# Patient Record
Sex: FEMALE | Race: BLACK OR AFRICAN AMERICAN | NOT HISPANIC OR LATINO | Employment: STUDENT | URBAN - METROPOLITAN AREA
[De-identification: names, ages, dates, MRNs, and addresses within clinical notes are randomized per-mention and may not be internally consistent; named-entity substitution may affect disease eponyms.]

---
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
## 2022-01-04 ENCOUNTER — LAB VISIT (OUTPATIENT)
Dept: PRIMARY CARE CLINIC | Facility: OTHER | Age: 22
End: 2022-01-04
Payer: MEDICAID

## 2022-01-04 DIAGNOSIS — Z20.822 ENCOUNTER FOR LABORATORY TESTING FOR COVID-19 VIRUS: ICD-10-CM

## 2022-01-04 PROCEDURE — U0003 INFECTIOUS AGENT DETECTION BY NUCLEIC ACID (DNA OR RNA); SEVERE ACUTE RESPIRATORY SYNDROME CORONAVIRUS 2 (SARS-COV-2) (CORONAVIRUS DISEASE [COVID-19]), AMPLIFIED PROBE TECHNIQUE, MAKING USE OF HIGH THROUGHPUT TECHNOLOGIES AS DESCRIBED BY CMS-2020-01-R: HCPCS | Performed by: INTERNAL MEDICINE

## 2022-01-08 LAB
SARS-COV-2 RNA RESP QL NAA+PROBE: DETECTED
SARS-COV-2- CYCLE NUMBER: 25

## 2022-08-10 ENCOUNTER — PATIENT OUTREACH (OUTPATIENT)
Dept: EMERGENCY MEDICINE | Facility: HOSPITAL | Age: 22
End: 2022-08-10
Payer: MEDICAID

## 2022-10-18 ENCOUNTER — OFFICE VISIT (OUTPATIENT)
Dept: URGENT CARE | Facility: CLINIC | Age: 22
End: 2022-10-18
Payer: MEDICAID

## 2022-10-18 VITALS
HEIGHT: 63 IN | WEIGHT: 160 LBS | RESPIRATION RATE: 18 BRPM | SYSTOLIC BLOOD PRESSURE: 123 MMHG | OXYGEN SATURATION: 98 % | DIASTOLIC BLOOD PRESSURE: 79 MMHG | TEMPERATURE: 98 F | BODY MASS INDEX: 28.35 KG/M2 | HEART RATE: 74 BPM

## 2022-10-18 DIAGNOSIS — N76.0 ACUTE VAGINITIS: Primary | ICD-10-CM

## 2022-10-18 DIAGNOSIS — N89.8 VAGINAL ITCHING: ICD-10-CM

## 2022-10-18 LAB
BILIRUB UR QL STRIP: NEGATIVE
GLUCOSE UR QL STRIP: NEGATIVE
KETONES UR QL STRIP: NEGATIVE
LEUKOCYTE ESTERASE UR QL STRIP: NEGATIVE
PH, POC UA: 6 (ref 5–8)
POC BLOOD, URINE: NEGATIVE
POC NITRATES, URINE: NEGATIVE
PROT UR QL STRIP: NEGATIVE
SP GR UR STRIP: 1.02 (ref 1–1.03)
UROBILINOGEN UR STRIP-ACNC: NORMAL (ref 0.1–1.1)

## 2022-10-18 PROCEDURE — 3078F PR MOST RECENT DIASTOLIC BLOOD PRESSURE < 80 MM HG: ICD-10-PCS | Mod: CPTII,S$GLB,, | Performed by: PHYSICIAN ASSISTANT

## 2022-10-18 PROCEDURE — 99214 OFFICE O/P EST MOD 30 MIN: CPT | Mod: S$GLB,,, | Performed by: PHYSICIAN ASSISTANT

## 2022-10-18 PROCEDURE — 1160F RVW MEDS BY RX/DR IN RCRD: CPT | Mod: CPTII,S$GLB,, | Performed by: PHYSICIAN ASSISTANT

## 2022-10-18 PROCEDURE — 3078F DIAST BP <80 MM HG: CPT | Mod: CPTII,S$GLB,, | Performed by: PHYSICIAN ASSISTANT

## 2022-10-18 PROCEDURE — 1159F MED LIST DOCD IN RCRD: CPT | Mod: CPTII,S$GLB,, | Performed by: PHYSICIAN ASSISTANT

## 2022-10-18 PROCEDURE — 1160F PR REVIEW ALL MEDS BY PRESCRIBER/CLIN PHARMACIST DOCUMENTED: ICD-10-PCS | Mod: CPTII,S$GLB,, | Performed by: PHYSICIAN ASSISTANT

## 2022-10-18 PROCEDURE — 3074F SYST BP LT 130 MM HG: CPT | Mod: CPTII,S$GLB,, | Performed by: PHYSICIAN ASSISTANT

## 2022-10-18 PROCEDURE — 99214 PR OFFICE/OUTPT VISIT, EST, LEVL IV, 30-39 MIN: ICD-10-PCS | Mod: S$GLB,,, | Performed by: PHYSICIAN ASSISTANT

## 2022-10-18 PROCEDURE — 87591 N.GONORRHOEAE DNA AMP PROB: CPT | Performed by: PHYSICIAN ASSISTANT

## 2022-10-18 PROCEDURE — 1159F PR MEDICATION LIST DOCUMENTED IN MEDICAL RECORD: ICD-10-PCS | Mod: CPTII,S$GLB,, | Performed by: PHYSICIAN ASSISTANT

## 2022-10-18 PROCEDURE — 81003 URINALYSIS AUTO W/O SCOPE: CPT | Mod: QW,S$GLB,, | Performed by: PHYSICIAN ASSISTANT

## 2022-10-18 PROCEDURE — 3074F PR MOST RECENT SYSTOLIC BLOOD PRESSURE < 130 MM HG: ICD-10-PCS | Mod: CPTII,S$GLB,, | Performed by: PHYSICIAN ASSISTANT

## 2022-10-18 PROCEDURE — 81514 NFCT DS BV&VAGINITIS DNA ALG: CPT | Performed by: PHYSICIAN ASSISTANT

## 2022-10-18 PROCEDURE — 81003 POCT URINALYSIS, DIPSTICK, AUTOMATED, W/O SCOPE: ICD-10-PCS | Mod: QW,S$GLB,, | Performed by: PHYSICIAN ASSISTANT

## 2022-10-18 PROCEDURE — 87491 CHLMYD TRACH DNA AMP PROBE: CPT | Performed by: PHYSICIAN ASSISTANT

## 2022-10-18 RX ORDER — METRONIDAZOLE 500 MG/1
500 TABLET ORAL EVERY 12 HOURS
Qty: 14 TABLET | Refills: 0 | Status: SHIPPED | OUTPATIENT
Start: 2022-10-18 | End: 2022-10-25

## 2022-10-18 NOTE — PROGRESS NOTES
"Subjective:       Patient ID: Kevyn Arnold is a 21 y.o. female.    Vitals:  height is 5' 3" (1.6 m) and weight is 72.6 kg (160 lb). Her tympanic temperature is 98.2 °F (36.8 °C). Her blood pressure is 123/79 and her pulse is 74. Her respiration is 18 and oxygen saturation is 98%.     Chief Complaint: Vaginal Itching    Patient provider note starts here:  Patient presents with complaints of vaginal irritation and discharge which has been present for the past week. Reports that she had her menstrual cycle twice this past month and it recently stopped about a week ago when these symptoms started. She feels that it may be related to wearing pads. Denies ulcerations to the genital region. Reports some dysuria when the urine hits the genital skin. History of PID in the past.    Vaginal Itching  The patient's primary symptoms include genital itching, a genital rash and vaginal discharge. The patient's pertinent negatives include no genital odor, pelvic pain or vaginal bleeding. Primary symptoms comment: genital irriation. The current episode started in the past 7 days (7 days). The problem occurs constantly. The problem has been gradually worsening. The problem affects both sides. She is not pregnant. Associated symptoms include dysuria (Skin irritation) and rash. Pertinent negatives include no abdominal pain, diarrhea, discolored urine, fever, flank pain, frequency, headaches, nausea, painful intercourse or vomiting. The vaginal discharge was milky, yellow and white. There has been no bleeding. She has not been passing clots. She has not been passing tissue. The symptoms are aggravated by urinating. Treatments tried: Aquifer, powder and diaper rash creme, The treatment provided no relief. She is sexually active. No, her partner does not have an STD. She uses oral contraceptives for contraception. Her menstrual history has been irregular. There is no history of miscarriage, ovarian cysts, an STD or vaginosis. "     Constitution: Negative for fever.   Neck: Negative for neck pain.   Cardiovascular:  Negative for chest pain, palpitations and sob on exertion.   Respiratory:  Negative for chest tightness and wheezing.    Gastrointestinal:  Negative for abdominal pain, nausea, vomiting and diarrhea.   Genitourinary:  Positive for dysuria (Skin irritation), irregular menstruation and vaginal discharge. Negative for frequency, flank pain, ovarian cysts, genital sore and pelvic pain.   Skin:  Positive for rash. Negative for color change and wound.   Neurological:  Negative for headaches, numbness and tingling.     Objective:      Physical Exam   Constitutional: She is oriented to person, place, and time. She appears well-developed.   HENT:   Head: Normocephalic and atraumatic.   Ears:   Right Ear: External ear normal.   Left Ear: External ear normal.   Nose: Nose normal. No nasal deformity. No epistaxis.   Mouth/Throat: Oropharynx is clear and moist and mucous membranes are normal.   Eyes: Lids are normal.   Neck: Trachea normal and phonation normal. Neck supple.   Cardiovascular: Normal pulses.   Pulmonary/Chest: Effort normal.   Abdominal: Normal appearance. There is no left CVA tenderness and no right CVA tenderness.   Genitourinary:    Vaginal discharge present.           Comments: Chaperone present. External exam was performed. There is a thin white discharge noted to the external genitalia coming from the vaginal opening. There is no visible rash or ulcerations noted.        Neurological: She is alert and oriented to person, place, and time.   Skin: Skin is warm, dry and intact.   Psychiatric: Her speech is normal and behavior is normal.   Nursing note and vitals reviewed.      Assessment:       1. Acute vaginitis    2. Vaginal itching        Results for orders placed or performed in visit on 10/18/22   POCT Urinalysis, Dipstick, Automated, W/O Scope   Result Value Ref Range    POC Blood, Urine Negative Negative    POC  Bilirubin, Urine Negative Negative    POC Urobilinogen, Urine normal 0.1 - 1.1    POC Ketones, Urine Negative Negative    POC Protein, Urine Negative Negative    POC Nitrates, Urine Negative Negative    POC Glucose, Urine Negative Negative    pH, UA 6.0 5 - 8    POC Specific Gravity, Urine 1.020 1.003 - 1.029    POC Leukocytes, Urine Negative Negative       Plan:         Acute vaginitis  -     Ambulatory referral/consult to Gynecology    Vaginal itching  -     POCT Urinalysis, Dipstick, Automated, W/O Scope  -     VAGINOSIS SCREEN BY DNA PROBE  -     C. trachomatis/N. gonorrhoeae by AMP DNA [facility]; Urine  -     metroNIDAZOLE (FLAGYL) 500 MG tablet; Take 1 tablet (500 mg total) by mouth every 12 (twelve) hours. for 7 days  Dispense: 14 tablet; Refill: 0         Medical Decision Making:   History:   Old Medical Records: I decided to obtain old medical records.  Differential Diagnosis:   Differential Diagnosis includes, but is not limited to:  Vaginal infection such as yeast infection, vaginitis, topical irritant, bacterial vaginosis, STD such as gonorrhea, chlamydia, UTI, discomfort of pregnancy, ectopic pregnancy, ovarian cysts, ovarian torsion, constipation, colitis, diverticulitis      Clinical Tests:   Lab Tests: Ordered and Reviewed       <> Summary of Lab: UA normal    Urgent Care Management:  Patient presents with complaints of vaginal irritation and burning. On exam, she is afebrile and nontoxic appearing. There is a thin white discharge noted on external exam. Vaginosis swab and GC/chlamydia tests were sent and she has been prescribed Flagyl and encouraged to follow-up with GYN. ED precautions discussed, safe sex practices discussed and encouraged to refrain from intercourse until test results are available and partners are tested and treated if needed. She verbalized understanding and agreed with plan.        Patient Instructions   You should not have sex until you complete the medications that were  prescribed and/or he received all test results.  All partners will need to be tested and treated if you test positive for anything.  You should use condoms in the future to prevent sexually transmitted infections.    The proper amount of time needed to pass before getting proper test results after juan a sexually transmitted infection are as follows:  2 weeks: trichomonas, gonorrhea and chlamydia   1 week to 3 months: syphilis (RPR)  6 weeks to 3 months: HIV, Herpes, hepatitis C and B.     If you have negative tests please make sure to repeat testing in 3-6 months.      You must understand that you've received an Urgent Care treatment only and that you may be released before all your medical problems are known or treated. You, the patient, will arrange for follow up care as instructed.      Follow up with your PCP or specialty clinic as instructed in the next 2-3 days if not improved or as needed. You can call [phone] to schedule an appointment with appropriate provider.      If you condition worsens, we recommend that you receive another evaluation at the emergency room immediately or contact your primary medical clinic's after hours call service to discuss your concerns.      Please return here or go to the Emergency Department for any concerns or worsening condition.      If you were prescribed a narcotic or controlled substance, do not drive or operate heavy equipment or machinery while taking these medications.

## 2022-10-18 NOTE — PATIENT INSTRUCTIONS
You should not have sex until you complete the medications that were prescribed and/or he received all test results.  All partners will need to be tested and treated if you test positive for anything.  You should use condoms in the future to prevent sexually transmitted infections.    The proper amount of time needed to pass before getting proper test results after juan a sexually transmitted infection are as follows:  2 weeks: trichomonas, gonorrhea and chlamydia   1 week to 3 months: syphilis (RPR)  6 weeks to 3 months: HIV, Herpes, hepatitis C and B.     If you have negative tests please make sure to repeat testing in 3-6 months.      You must understand that you've received an Urgent Care treatment only and that you may be released before all your medical problems are known or treated. You, the patient, will arrange for follow up care as instructed.      Follow up with your PCP or specialty clinic as instructed in the next 2-3 days if not improved or as needed. You can call [phone] to schedule an appointment with appropriate provider.      If you condition worsens, we recommend that you receive another evaluation at the emergency room immediately or contact your primary medical clinic's after hours call service to discuss your concerns.      Please return here or go to the Emergency Department for any concerns or worsening condition.      If you were prescribed a narcotic or controlled substance, do not drive or operate heavy equipment or machinery while taking these medications.

## 2022-10-19 ENCOUNTER — TELEPHONE (OUTPATIENT)
Dept: URGENT CARE | Facility: CLINIC | Age: 22
End: 2022-10-19
Payer: MEDICAID

## 2022-10-19 DIAGNOSIS — B37.31 YEAST INFECTION OF THE VAGINA: Primary | ICD-10-CM

## 2022-10-19 LAB
BACTERIAL VAGINOSIS DNA: NEGATIVE
C TRACH DNA SPEC QL NAA+PROBE: NOT DETECTED
CANDIDA GLABRATA DNA: NEGATIVE
CANDIDA KRUSEI DNA: NEGATIVE
CANDIDA RRNA VAG QL PROBE: POSITIVE
N GONORRHOEA DNA SPEC QL NAA+PROBE: NOT DETECTED
T VAGINALIS RRNA GENITAL QL PROBE: NEGATIVE

## 2022-10-19 RX ORDER — FLUCONAZOLE 200 MG/1
200 TABLET ORAL DAILY
Qty: 2 TABLET | Refills: 0 | Status: SHIPPED | OUTPATIENT
Start: 2022-10-19 | End: 2022-10-21

## 2022-10-19 NOTE — TELEPHONE ENCOUNTER
CALL PATIENT TO DISCUSS SWAB RESULT WHICH IS POSITIVE FOR [facility] BUT SHE DOES NOT HAVE A PHONE SET UP.  I WILL SEND A PRESCRIPTION FOR DIFLUCAN TO HER PHARMACY.

## 2022-10-19 NOTE — TELEPHONE ENCOUNTER
A CALL PATIENT AND ATTEMPTED TO LEAVE A VOICEMAIL BUT IT STATES THAT THE PHONE IS NOT ACCEPTING CALLS.

## 2022-11-21 ENCOUNTER — TELEPHONE (OUTPATIENT)
Dept: OBSTETRICS AND GYNECOLOGY | Facility: CLINIC | Age: 22
End: 2022-11-21
Payer: MEDICAID

## 2022-11-21 ENCOUNTER — OFFICE VISIT (OUTPATIENT)
Dept: OBSTETRICS AND GYNECOLOGY | Facility: CLINIC | Age: 22
End: 2022-11-21
Payer: MEDICAID

## 2022-11-21 VITALS — WEIGHT: 166 LBS | DIASTOLIC BLOOD PRESSURE: 70 MMHG | BODY MASS INDEX: 29.41 KG/M2 | SYSTOLIC BLOOD PRESSURE: 107 MMHG

## 2022-11-21 DIAGNOSIS — Z00.00 ROUTINE CHECK-UP: ICD-10-CM

## 2022-11-21 DIAGNOSIS — R10.84 GENERALIZED ABDOMINAL PAIN: ICD-10-CM

## 2022-11-21 DIAGNOSIS — Z11.3 SCREENING EXAMINATION FOR SEXUALLY TRANSMITTED DISEASE: ICD-10-CM

## 2022-11-21 DIAGNOSIS — N76.0 ACUTE VAGINITIS: Primary | ICD-10-CM

## 2022-11-21 PROCEDURE — 1159F MED LIST DOCD IN RCRD: CPT | Mod: CPTII,,, | Performed by: NURSE PRACTITIONER

## 2022-11-21 PROCEDURE — 1160F RVW MEDS BY RX/DR IN RCRD: CPT | Mod: CPTII,,, | Performed by: NURSE PRACTITIONER

## 2022-11-21 PROCEDURE — 99203 PR OFFICE/OUTPT VISIT, NEW, LEVL III, 30-44 MIN: ICD-10-PCS | Mod: S$PBB,,, | Performed by: NURSE PRACTITIONER

## 2022-11-21 PROCEDURE — 87591 N.GONORRHOEAE DNA AMP PROB: CPT | Performed by: NURSE PRACTITIONER

## 2022-11-21 PROCEDURE — 1159F PR MEDICATION LIST DOCUMENTED IN MEDICAL RECORD: ICD-10-PCS | Mod: CPTII,,, | Performed by: NURSE PRACTITIONER

## 2022-11-21 PROCEDURE — 87491 CHLMYD TRACH DNA AMP PROBE: CPT | Performed by: NURSE PRACTITIONER

## 2022-11-21 PROCEDURE — 99999 PR PBB SHADOW E&M-EST. PATIENT-LVL III: CPT | Mod: PBBFAC,,, | Performed by: NURSE PRACTITIONER

## 2022-11-21 PROCEDURE — 3074F SYST BP LT 130 MM HG: CPT | Mod: CPTII,,, | Performed by: NURSE PRACTITIONER

## 2022-11-21 PROCEDURE — 1160F PR REVIEW ALL MEDS BY PRESCRIBER/CLIN PHARMACIST DOCUMENTED: ICD-10-PCS | Mod: CPTII,,, | Performed by: NURSE PRACTITIONER

## 2022-11-21 PROCEDURE — 81514 NFCT DS BV&VAGINITIS DNA ALG: CPT | Performed by: NURSE PRACTITIONER

## 2022-11-21 PROCEDURE — 3074F PR MOST RECENT SYSTOLIC BLOOD PRESSURE < 130 MM HG: ICD-10-PCS | Mod: CPTII,,, | Performed by: NURSE PRACTITIONER

## 2022-11-21 PROCEDURE — 99213 OFFICE O/P EST LOW 20 MIN: CPT | Mod: PBBFAC,PO | Performed by: NURSE PRACTITIONER

## 2022-11-21 PROCEDURE — 3078F PR MOST RECENT DIASTOLIC BLOOD PRESSURE < 80 MM HG: ICD-10-PCS | Mod: CPTII,,, | Performed by: NURSE PRACTITIONER

## 2022-11-21 PROCEDURE — 3008F BODY MASS INDEX DOCD: CPT | Mod: CPTII,,, | Performed by: NURSE PRACTITIONER

## 2022-11-21 PROCEDURE — 99203 OFFICE O/P NEW LOW 30 MIN: CPT | Mod: S$PBB,,, | Performed by: NURSE PRACTITIONER

## 2022-11-21 PROCEDURE — 3008F PR BODY MASS INDEX (BMI) DOCUMENTED: ICD-10-PCS | Mod: CPTII,,, | Performed by: NURSE PRACTITIONER

## 2022-11-21 PROCEDURE — 3078F DIAST BP <80 MM HG: CPT | Mod: CPTII,,, | Performed by: NURSE PRACTITIONER

## 2022-11-21 PROCEDURE — 99999 PR PBB SHADOW E&M-EST. PATIENT-LVL III: ICD-10-PCS | Mod: PBBFAC,,, | Performed by: NURSE PRACTITIONER

## 2022-11-21 NOTE — TELEPHONE ENCOUNTER
----- Message from Wendy Cohen sent at 11/21/2022  1:24 PM CST -----  Type:  Needs Medical Advice    Who Called: Pt  Would the patient rather a call back or a response via [facility]? call  Best Call Back Number: [phone]  Additional Information: pt is running 18 mins late went to wrong location please call pt

## 2022-11-21 NOTE — PROGRESS NOTES
"CC: Abdominal pain    Kevyn Arnold is a 22 y.o. female  Patient's last menstrual period was 10/20/2022 (approximate). presents with abdominal pain. Pain location and time varies, she states she sometimes has abdominal pain all over, she can go days with no pain. She also reports nausea and vomiting but has no known associated triggers. States she has  "alexa of" abnormal vaginal discharge. Denies vaginal itching, vaginal odor or dysuria. She uses nexplanon for contraception, placed , has irregular cycles.    Sexually active with male and female partners.     She states shes looking for a primary care provider      Past Surgical History:   Procedure Laterality Date     SECTION         OB History    Para Term  AB Living   1         2   SAB IAB Ectopic Multiple Live Births         1 2      # Outcome Date GA Lbr Nikhil/2nd Weight Sex Delivery Anes PTL Lv   1A       CS-Unspec   IZABELA   1B       CS-Unspec   IZABELA       Family History   Problem Relation Age of Onset    Diabetes Maternal Grandfather        Social History     Socioeconomic History    Marital status: Single   Tobacco Use    Smoking status: Never    Smokeless tobacco: Never   Substance and Sexual Activity    Alcohol use: Never    Drug use: Never    Sexual activity: Yes     Partners: Male     Birth control/protection: Implant       /70   Wt 75.3 kg (166 lb 0.1 oz)   LMP 10/20/2022 (Approximate)   BMI 29.41 kg/m²     ROS:  GENERAL: No fever, chills, fatigability or weight loss.  VULVAR: No pain, no lesions and no itching.  VAGINAL: No relaxation, no itching, no discharge, no abnormal bleeding and no lesions.  ABDOMEN: + abdominal pain. + nausea. + vomiting. No diarrhea. No constipation  BREAST: Denies pain. No lumps. No discharge.  URINARY: No incontinence, no nocturia, no frequency and no dysuria.  CARDIOVASCULAR: No chest pain. No shortness of breath. No leg cramps.  NEUROLOGICAL: No headaches. No vision " changes.      PHYSICAL EXAM:    APPEARANCE: Well nourished, well developed, in no acute distress.  AFFECT: Alert and oriented x 3  SKIN: Warm, dry, & intact. No acne or hirsutism.  NECK: Neck symmetric  NODES: No inguinal or femoral lymph node enlargement  CHEST:  Easy, even breaths.  ABDOMEN: Soft.  Nontender, nondistended.  PELVIC: Normal external genitalia without lesions.  Normal hair distribution.  Adequate perineal body, normal urethral meatus.  Vagina moist and well rugated without lesions or discharge.  Cervix pink, without lesions, discharge or tenderness.  No significant cystocele or rectocele. Bimanual exam shows uterus to be normal size, regular, mobile and nontender.  Adnexa without masses or tenderness.    Anus Perineum: No lesions, no relaxation, no external hemorrhoids.  EXTREMITIES: No edema.    ASSESSMENT AND PLAN  1. Acute vaginitis        2. Generalized abdominal pain  Vaginosis Screen by DNA Probe    C. trachomatis/N. gonorrhoeae by AMP DNA    US Pelvis Comp with Transvag NON-OB (xpd    US Abdomen Complete    POCT urine pregnancy      3. Screening examination for sexually transmitted disease  HIV 1/2 Ag/Ab (4th Gen)    RPR    Hepatitis Panel, Acute      4. Routine check-up  Ambulatory referral/consult to Internal Medicine        Discussed:  -Possible causes of abdominal pain including GYN vs GI causes, evaluation as above, no acute findings on todays exam  -Irregular cycles are a common side effect of nexplanon  -Recommend establishing care with a primary care provider to r/o other causes of abdominal pain and for routine health care maintenance.     Follow up: Pending lab results

## 2022-11-22 ENCOUNTER — TELEPHONE (OUTPATIENT)
Dept: URGENT CARE | Facility: CLINIC | Age: 22
End: 2022-11-22
Payer: MEDICAID

## 2022-11-22 LAB
C TRACH DNA SPEC QL NAA+PROBE: NOT DETECTED
N GONORRHOEA DNA SPEC QL NAA+PROBE: NOT DETECTED

## 2022-11-22 NOTE — TELEPHONE ENCOUNTER
Spoke with pt regarding lab results. Provider sent prescription to pharmacy. Pt instructed to call and schedule appoint for referral.

## 2022-11-23 ENCOUNTER — OFFICE VISIT (OUTPATIENT)
Dept: FAMILY MEDICINE | Facility: HOSPITAL | Age: 22
End: 2022-11-23
Payer: MEDICAID

## 2022-11-23 VITALS
SYSTOLIC BLOOD PRESSURE: 111 MMHG | WEIGHT: 162.69 LBS | HEIGHT: 63 IN | DIASTOLIC BLOOD PRESSURE: 62 MMHG | BODY MASS INDEX: 28.82 KG/M2 | HEART RATE: 82 BPM

## 2022-11-23 DIAGNOSIS — F41.1 GAD (GENERALIZED ANXIETY DISORDER): ICD-10-CM

## 2022-11-23 DIAGNOSIS — Z00.00 ROUTINE CHECK-UP: ICD-10-CM

## 2022-11-23 DIAGNOSIS — G43.109 MIGRAINE WITH AURA AND WITHOUT STATUS MIGRAINOSUS, NOT INTRACTABLE: Primary | ICD-10-CM

## 2022-11-23 DIAGNOSIS — R10.30 LOWER ABDOMINAL PAIN: ICD-10-CM

## 2022-11-23 DIAGNOSIS — N83.201 RIGHT OVARIAN CYST: Primary | ICD-10-CM

## 2022-11-23 DIAGNOSIS — B37.31 CANDIDA VAGINITIS: Primary | ICD-10-CM

## 2022-11-23 LAB
BACTERIAL VAGINOSIS DNA: NEGATIVE
CANDIDA GLABRATA DNA: NEGATIVE
CANDIDA KRUSEI DNA: NEGATIVE
CANDIDA RRNA VAG QL PROBE: POSITIVE
T VAGINALIS RRNA GENITAL QL PROBE: NEGATIVE

## 2022-11-23 PROCEDURE — 99214 OFFICE O/P EST MOD 30 MIN: CPT | Performed by: STUDENT IN AN ORGANIZED HEALTH CARE EDUCATION/TRAINING PROGRAM

## 2022-11-23 RX ORDER — ESCITALOPRAM OXALATE 10 MG/1
10 TABLET ORAL DAILY
Qty: 30 TABLET | Refills: 11 | Status: SHIPPED | OUTPATIENT
Start: 2022-11-23 | End: 2023-05-18 | Stop reason: SDUPTHER

## 2022-11-23 RX ORDER — PROPRANOLOL HYDROCHLORIDE 20 MG/1
10 TABLET ORAL 2 TIMES DAILY
Qty: 30 TABLET | Refills: 11 | Status: SHIPPED | OUTPATIENT
Start: 2022-11-23 | End: 2023-11-23

## 2022-11-23 RX ORDER — IBUPROFEN 800 MG/1
800 TABLET ORAL 3 TIMES DAILY PRN
Qty: 30 TABLET | Refills: 2 | Status: SHIPPED | OUTPATIENT
Start: 2022-11-23 | End: 2023-05-18 | Stop reason: SDUPTHER

## 2022-11-23 RX ORDER — SUMATRIPTAN 50 MG/1
50 TABLET, FILM COATED ORAL
Qty: 30 TABLET | Refills: 2 | Status: SHIPPED | OUTPATIENT
Start: 2022-11-23 | End: 2023-05-18

## 2022-11-23 RX ORDER — FLUCONAZOLE 150 MG/1
150 TABLET ORAL DAILY
Qty: 2 TABLET | Refills: 1 | Status: SHIPPED | OUTPATIENT
Start: 2022-11-23 | End: 2023-01-23

## 2022-11-23 NOTE — PROGRESS NOTES
History & Physical  [facility]      SUBJECTIVE:     History of Present Illness:  Patient is a 22 y.o. female presents to clinic to establish care.  Patient's chief complaints are intermittent abdominal pain, migraines, anxiety.    For patient's abdominal pain, patient states that she has history of ruptured ovarian cysts, states that her pain is similar to this however does not appear to correlate with regular cycles.  However patient has been on Nexplanon and has had irregular cycles.  Patient describes the pain as acute onset and rapid resolution.  Is moderately controlled on OTC pain medications.  Denies any bladder or bowel involvement, no difference with food.  Has been seen by OB Gyne for this issue and is scheduled for pelvic imaging with ultrasound.    For patient's migraine patient states that she had a history of migraines in her childhood however they subsided for several years.  They have since returned after her son turned to.  States she gets multiple migraines a week.  States that the pain is manageable if she takes a Tylenol PM within 30 minutes of headache onset however if she does not use migraines in persist up to several days.  States that her triggers seem to include loud noises, bright lights.  Also notices or prior to headaches with changes in visual stimuli.  Patient has never been on a controller medication nor a Triptan.      Patient also endorses anxiety and depression mostly related to home stress and .  Is interested in starting therapy as well as pharmacotherapy.        - Colonoscopy: N/A   (Grade A: adults 45-75; colonoscopy q10y, fecal immunochemical testing (FIT) q1y, FIT-fecal DNA testing (Cologuard) q3y)  - DM: Last A1c: DUE today  (Grade B: adults 40-70 with BMI ?25; if normal, repeat q3y)  - If Diabetic:   - Foot Exam: N/A    - Eye Exam: N/A  - MMG: N/A  (Grade B: q1-2y for women 40-75; >75 after discussion on harms and benefits with patient)  - PAP: COMPLETED  "-  pending  (Grade A: q3y with cervical cytology alone in women 21-29 years. For women 30-65 years, q3y with cervical cytology alone, q5y with high-risk HPV (hrHPV) testing alone, or q5y with hrHPV testing in combination with cytology)  - Statin: NO  (Grade B: adults 40-75 years with no history of CVD, ?1 CVD risk factors (dyslipidemia, DM, HTN, or smoking), and ASCVD ?10%)  Last lipid panel-  today  - Flu: N/A  (All patients ?6 months, qyear)  - Pneumococcal Vaccination: N/A  (All patients >18 w/ risk factors or all patients ?65: 1 dose PCV15 followed by PPSV23 or 1 dose PCV20)  - Shingles: N/A  (adults ?50 years)  - HCV: DUE - ORDERED  (Grade B: screen all patients age 18-79)  - HIV: DUE - ORDERED  (Grade A: ages 15-65 years; ?66 if high-risk)  Last STI testing-  candida, otherwise wnl  - DXA: N/A  (Grade B: women ?65 years or post-menopausal women with risk-factors)  - LDCT: N/A  (Grade B: q1yr for adults 50-80 years with 20 PY and currently smoke or have quit ?15 years)  - US abdomen: N/A   (Grade B: one-time screening for AAA in men 65-75 years who have ever smoked)  - Depression: N/A  (All adults)  - Fall risk: N/A  Get Up and Go Test (positive >30 seconds, or negative <20; get up, walk 10 feet, turn around and sit; adults ?65 years).       Review of patient's allergies indicates:  No Known Allergies    Past Medical History:   Diagnosis Date    Anemia      Past Surgical History:   Procedure Laterality Date     SECTION       Family History   Problem Relation Age of Onset    Diabetes Maternal Grandfather      Social History     Tobacco Use    Smoking status: Never    Smokeless tobacco: Never   Substance Use Topics    Alcohol use: Never    Drug use: Never        OBJECTIVE:     Vital Signs (Most Recent)  Vitals:    22 1316   BP: 111/62   Pulse: 82   Weight: 73.8 kg (162 lb 11.2 oz)   Height: 5' 3" (1.6 m)     Body mass index is 28.82 kg/m².     Review of Systems   Constitutional:  Negative for " chills, fever and malaise/fatigue.   HENT:  Negative for congestion, ear pain, hearing loss and sore throat.    Eyes:  Negative for blurred vision, pain and redness.   Respiratory:  Negative for cough and shortness of breath.    Cardiovascular:  Negative for chest pain, palpitations and leg swelling.   Gastrointestinal:  Positive for abdominal pain. Negative for constipation, diarrhea, heartburn, nausea and vomiting.   Genitourinary:  Negative for dysuria, frequency and urgency.   Musculoskeletal:  Negative for back pain, joint pain and myalgias.   Skin:  Negative for rash.   Neurological:  Positive for headaches. Negative for focal weakness and weakness.   Psychiatric/Behavioral:  Negative for depression and substance abuse. The patient is nervous/anxious.       Physical Exam  Vitals reviewed.   Constitutional:       Appearance: Normal appearance.   HENT:      Head: Normocephalic and atraumatic.      Mouth/Throat:      Mouth: Mucous membranes are moist.      Pharynx: Oropharynx is clear.   Eyes:      Extraocular Movements: Extraocular movements intact.      Conjunctiva/sclera: Conjunctivae normal.   Cardiovascular:      Rate and Rhythm: Normal rate and regular rhythm.      Pulses: Normal pulses.      Heart sounds: Normal heart sounds.   Pulmonary:      Effort: Pulmonary effort is normal.      Breath sounds: Normal breath sounds.   Abdominal:      General: Abdomen is flat.      Palpations: Abdomen is soft.      Tenderness: There is no abdominal tenderness.   Musculoskeletal:         General: Normal range of motion.      Cervical back: Normal range of motion.   Skin:     General: Skin is warm.   Neurological:      General: No focal deficit present.      Mental Status: She is alert.   Psychiatric:         Mood and Affect: Mood normal.      GAD7: 15, PHQ9: 20      ASSESSMENT/PLAN:   22 y.o.female presents to clinic to establish care.  Will start patient on propranolol and Imitrex for patient's migraines, also obtain  TSH, lipid panel, A1c.  Also start Lexapro and refer to psychology for her anxiety.  Also prescribe 800 mg ibuprofen for patient's abdominal pain likely secondary to ruptured ovarian cysts.  Also encouraged patient to follow-up with OB Gyn for her pelvic imaging.    Migraine with aura and without status migrainosus, not intractable  -     sumatriptan (IMITREX) 50 MG tablet; Take 1 tablet (50 mg total) by mouth every 2 (two) hours as needed for Migraine. Do not take more than 2 in one day  Dispense: 30 tablet; Refill: 2  -     propranoloL (INDERAL) 20 MG tablet; Take 0.5 tablets (10 mg total) by mouth 2 (two) times daily.  Dispense: 30 tablet; Refill: 11    Routine check-up  -     Ambulatory referral/consult to Internal Medicine  -     Lipid Panel; Future; Expected date: 11/23/2022  -     Hemoglobin A1C; Future; Expected date: 11/23/2022    Lower abdominal pain  -     ibuprofen (ADVIL,MOTRIN) 800 MG tablet; Take 1 tablet (800 mg total) by mouth 3 (three) times daily as needed for Pain.  Dispense: 30 tablet; Refill: 2    BRENDA (generalized anxiety disorder)  -     EScitalopram oxalate (LEXAPRO) 10 MG tablet; Take 1 tablet (10 mg total) by mouth once daily.  Dispense: 30 tablet; Refill: 11  -     Ambulatory referral/consult to Psychology; Future; Expected date: 11/30/2022  -     TSH; Future; Expected date: 11/23/2022        Follow-up: 1 month    Jorge Luis Patel MD, MPH  [facility], PGY-2    This note was partially created using [employer] Voice Recognition software. Typographical and content errors may occur with this process. While efforts are made to detect and correct such errors, in some cases errors will persist. For this reason, wording in this document should be considered in the proper context and not strictly verbatim.

## 2023-01-06 NOTE — PROGRESS NOTES
I assume primary medical responsibility for this patient. I have reviewed the history, physical, and assessement & treatment plan with the resident and agree that the care is reasonable and necessary. This service has been performed by a resident without the presence of a teaching physician under the primary care exception. If necessary, an addendum of additional findings or evaluation beyond the resident documentation will be noted below.     Jacquie Lopez MD

## 2023-01-09 ENCOUNTER — OFFICE VISIT (OUTPATIENT)
Dept: FAMILY MEDICINE | Facility: HOSPITAL | Age: 23
End: 2023-01-09
Payer: MEDICAID

## 2023-01-09 DIAGNOSIS — F32.89 OTHER SPECIFIED DEPRESSIVE EPISODES: ICD-10-CM

## 2023-01-09 DIAGNOSIS — F41.8 OTHER SPECIFIED ANXIETY DISORDERS: Primary | ICD-10-CM

## 2023-01-09 PROCEDURE — 99212 OFFICE O/P EST SF 10 MIN: CPT | Performed by: PSYCHOLOGIST
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
## 2023-01-23 ENCOUNTER — TELEPHONE (OUTPATIENT)
Dept: OBSTETRICS AND GYNECOLOGY | Facility: CLINIC | Age: 23
End: 2023-01-23
Payer: MEDICAID

## 2023-01-23 RX ORDER — FLUCONAZOLE 150 MG/1
150 TABLET ORAL DAILY
Qty: 2 TABLET | Refills: 0 | Status: SHIPPED | OUTPATIENT
Start: 2023-01-23

## 2023-01-23 NOTE — TELEPHONE ENCOUNTER
Pt is complaining of having vaginal pain, thinks she has a yeast infection.    Pt complaining that it hurts to sit, walk, stand. Pt believes she has scaring on her vaginal area. Pt is having a white vaginal discharge with itching

## 2023-01-23 NOTE — TELEPHONE ENCOUNTER
----- Message from JAMIE Frederick-AL sent at 1/23/2023  4:41 PM CST -----  Please call and schedule with MD provider, bilateral ovarian cyst.

## 2023-01-23 NOTE — TELEPHONE ENCOUNTER
----- Message from Pili Thompson sent at 1/23/2023  2:25 PM CST -----  Regarding: Needs to be seen ASAP  Pt states that she is having  a lot of discomfort. She believes that she has a yeast infection really bad. It has been 3 days. She is going to the emergency room after her appointment today.Could you please call her [phone].    Thank you,  Pili

## 2023-01-26 ENCOUNTER — OFFICE VISIT (OUTPATIENT)
Dept: URGENT CARE | Facility: CLINIC | Age: 23
End: 2023-01-26
Payer: MEDICAID

## 2023-01-26 VITALS
HEART RATE: 78 BPM | SYSTOLIC BLOOD PRESSURE: 118 MMHG | WEIGHT: 160 LBS | OXYGEN SATURATION: 99 % | DIASTOLIC BLOOD PRESSURE: 74 MMHG | HEIGHT: 63 IN | TEMPERATURE: 98 F | RESPIRATION RATE: 18 BRPM | BODY MASS INDEX: 28.35 KG/M2

## 2023-01-26 DIAGNOSIS — N83.201 BILATERAL OVARIAN CYSTS: Primary | ICD-10-CM

## 2023-01-26 DIAGNOSIS — N89.8 VAGINAL DISCHARGE: ICD-10-CM

## 2023-01-26 DIAGNOSIS — N94.9 GENITAL LESION, FEMALE: ICD-10-CM

## 2023-01-26 DIAGNOSIS — R10.30 LOWER ABDOMINAL PAIN: ICD-10-CM

## 2023-01-26 DIAGNOSIS — N83.202 BILATERAL OVARIAN CYSTS: Primary | ICD-10-CM

## 2023-01-26 LAB
BILIRUB UR QL STRIP: NEGATIVE
GLUCOSE UR QL STRIP: NEGATIVE
KETONES UR QL STRIP: NEGATIVE
LEUKOCYTE ESTERASE UR QL STRIP: NEGATIVE
PH, POC UA: 5.5 (ref 5–8)
POC BLOOD, URINE: NEGATIVE
POC NITRATES, URINE: NEGATIVE
PROT UR QL STRIP: NEGATIVE
SP GR UR STRIP: 1.02 (ref 1–1.03)
UROBILINOGEN UR STRIP-ACNC: NORMAL (ref 0.1–1.1)

## 2023-01-26 PROCEDURE — 87529 HSV DNA AMP PROBE: CPT | Performed by: PHYSICIAN ASSISTANT

## 2023-01-26 PROCEDURE — 3074F PR MOST RECENT SYSTOLIC BLOOD PRESSURE < 130 MM HG: ICD-10-PCS | Mod: CPTII,S$GLB,, | Performed by: PHYSICIAN ASSISTANT

## 2023-01-26 PROCEDURE — 81003 URINALYSIS AUTO W/O SCOPE: CPT | Mod: QW,S$GLB,, | Performed by: PHYSICIAN ASSISTANT

## 2023-01-26 PROCEDURE — 99214 PR OFFICE/OUTPT VISIT, EST, LEVL IV, 30-39 MIN: ICD-10-PCS | Mod: S$GLB,,, | Performed by: PHYSICIAN ASSISTANT

## 2023-01-26 PROCEDURE — 81003 POCT URINALYSIS, DIPSTICK, AUTOMATED, W/O SCOPE: ICD-10-PCS | Mod: QW,S$GLB,, | Performed by: PHYSICIAN ASSISTANT

## 2023-01-26 PROCEDURE — 1159F MED LIST DOCD IN RCRD: CPT | Mod: CPTII,S$GLB,, | Performed by: PHYSICIAN ASSISTANT

## 2023-01-26 PROCEDURE — 3078F PR MOST RECENT DIASTOLIC BLOOD PRESSURE < 80 MM HG: ICD-10-PCS | Mod: CPTII,S$GLB,, | Performed by: PHYSICIAN ASSISTANT

## 2023-01-26 PROCEDURE — 99214 OFFICE O/P EST MOD 30 MIN: CPT | Mod: S$GLB,,, | Performed by: PHYSICIAN ASSISTANT

## 2023-01-26 PROCEDURE — 3008F PR BODY MASS INDEX (BMI) DOCUMENTED: ICD-10-PCS | Mod: CPTII,S$GLB,, | Performed by: PHYSICIAN ASSISTANT

## 2023-01-26 PROCEDURE — 1160F RVW MEDS BY RX/DR IN RCRD: CPT | Mod: CPTII,S$GLB,, | Performed by: PHYSICIAN ASSISTANT

## 2023-01-26 PROCEDURE — 3074F SYST BP LT 130 MM HG: CPT | Mod: CPTII,S$GLB,, | Performed by: PHYSICIAN ASSISTANT

## 2023-01-26 PROCEDURE — 3008F BODY MASS INDEX DOCD: CPT | Mod: CPTII,S$GLB,, | Performed by: PHYSICIAN ASSISTANT

## 2023-01-26 PROCEDURE — 1159F PR MEDICATION LIST DOCUMENTED IN MEDICAL RECORD: ICD-10-PCS | Mod: CPTII,S$GLB,, | Performed by: PHYSICIAN ASSISTANT

## 2023-01-26 PROCEDURE — 3078F DIAST BP <80 MM HG: CPT | Mod: CPTII,S$GLB,, | Performed by: PHYSICIAN ASSISTANT

## 2023-01-26 PROCEDURE — 1160F PR REVIEW ALL MEDS BY PRESCRIBER/CLIN PHARMACIST DOCUMENTED: ICD-10-PCS | Mod: CPTII,S$GLB,, | Performed by: PHYSICIAN ASSISTANT

## 2023-01-26 RX ORDER — NAPROXEN 500 MG/1
500 TABLET ORAL 2 TIMES DAILY
Qty: 30 TABLET | Refills: 0 | Status: SHIPPED | OUTPATIENT
Start: 2023-01-26 | End: 2023-09-14

## 2023-01-26 NOTE — PROGRESS NOTES
"Subjective:       Patient ID: Kevyn Arnold is a 22 y.o. female.    Vitals:  height is 5' 3" (1.6 m) and weight is 72.6 kg (160 lb). Her tympanic temperature is 97.9 °F (36.6 °C). Her blood pressure is 118/74 and her pulse is 78. Her respiration is 18 and oxygen saturation is 99%.     Chief Complaint: Abdominal Pain    Pt complains of abdominal pain in the LLQ and RLQ. Pain has been on and off for 1 year and is worsening. Pt says she has a cyst in her ovaries that she believes to be causing this pain. Pt has not taken anything to relieve pain.     Patient provider note starts here:  Patient presents with complaints of lower abdominal pain on both the right and left sides.  Reports that she is had this sharp intermittent pain going on for over a year now but it seems to be worsening and coming more frequently recently.  Of note, recently presented to an ED but she left prior to evaluation.  Recent pelvic ultrasound shows bilateral ovarian cysts.  Of note, she also reports some vaginal pain and states that she gets "boils" down there at times that get so painful that she cannot sit. She     Abdominal Pain  The current episode started more than 1 year ago (1 year). The onset quality is sudden. The problem occurs every several days. The most recent episode lasted 1 hour. The problem has been gradually worsening. The pain is located in the LLQ and RLQ. The pain is at a severity of 10/10. The pain is severe. The quality of the pain is sharp. The abdominal pain radiates to the chest and pelvis. Associated symptoms include belching, headaches and nausea. Pertinent negatives include no anorexia, arthralgias, constipation, diarrhea, dysuria, fever, flatus, frequency, hematochezia, hematuria, melena, myalgias, vomiting or weight loss. The pain is aggravated by movement. The pain is relieved by Nothing. She has tried nothing for the symptoms. The treatment provided no relief. There is no history of abdominal surgery, colon " cancer, Crohn's disease, gallstones, GERD, irritable bowel syndrome, pancreatitis, PUD or ulcerative colitis. Patient's medical history does not include kidney stones and UTI.     Constitution: Negative for fever.   Gastrointestinal:  Positive for abdominal pain and nausea. Negative for history of abdominal surgery, vomiting, constipation, diarrhea and bright red blood in stool.   Genitourinary:  Positive for ovarian cysts, vaginal discharge, genital sore and pelvic pain. Negative for dysuria, frequency, urgency and hematuria.   Musculoskeletal:  Negative for joint pain and muscle ache.   Neurological:  Positive for headaches.     Objective:      Physical Exam   Constitutional: She is oriented to person, place, and time. She appears well-developed.   HENT:   Head: Normocephalic and atraumatic.   Ears:   Right Ear: External ear normal.   Left Ear: External ear normal.   Nose: Nose normal.   Mouth/Throat: Mucous membranes are normal.   Eyes: Conjunctivae and lids are normal.   Neck: Trachea normal. Neck supple.   Cardiovascular: Normal rate, regular rhythm and normal heart sounds.   Pulmonary/Chest: Effort normal and breath sounds normal. No respiratory distress.   Abdominal: Normal appearance and bowel sounds are normal. She exhibits no distension and no mass. Soft. There is no abdominal tenderness. There is no left CVA tenderness and no right CVA tenderness.   Genitourinary:    Vaginal discharge present.           Comments: Chaperone present  There is a lesion to the right labia majora. There is a red base noted with some skin removed. There are no other lesions noted. There is mild tenderness over the area. No swelling noted to the labia majora. Thin white discharge noted from the vaginal opening.        Musculoskeletal: Normal range of motion.         General: Normal range of motion.   Neurological: She is alert and oriented to person, place, and time. She has normal strength.   Skin: Skin is warm, dry, intact,  not diaphoretic and not pale.   Psychiatric: Her speech is normal and behavior is normal. Judgment and thought content normal.   Nursing note and vitals reviewed.      Assessment:       1. Bilateral ovarian cysts    2. Lower abdominal pain    3. Vaginal discharge    4. Genital lesion, female        Results for orders placed or performed in visit on 01/26/23   POCT Urinalysis, Dipstick, Automated, W/O Scope   Result Value Ref Range    POC Blood, Urine Negative Negative    POC Bilirubin, Urine Negative Negative    POC Urobilinogen, Urine norm 0.1 - 1.1    POC Ketones, Urine Negative Negative    POC Protein, Urine Negative Negative    POC Nitrates, Urine Negative Negative    POC Glucose, Urine Negative Negative    pH, UA 5.5 5 - 8    POC Specific Gravity, Urine 1.025 1.003 - 1.029    POC Leukocytes, Urine Negative Negative       Plan:         Bilateral ovarian cysts  -     naproxen (NAPROSYN) 500 MG tablet; Take 1 tablet (500 mg total) by mouth 2 (two) times daily.  Dispense: 30 tablet; Refill: 0    Lower abdominal pain  -     POCT Urinalysis, Dipstick, Automated, W/O Scope    Vaginal discharge  -     NuSwab Vaginitis (VG)    Genital lesion, female  -     HSV by Rapid PCR, Non-Blood [facility]; Vagina           Medical Decision Making:   History:   Old Medical Records: I decided to obtain old medical records.  Old Records Summarized: records from clinic visits.       <> Summary of Records: Recently evaluated in the ED but eloped. She had a pelvic ultrasound recently which shows bilateral ovarian cyst.   Differential Diagnosis:   Differential Diagnosis includes, but is not limited to:  Vaginal infection such as yeast infection, vaginitis, topical irritant, bacterial vaginosis, STD such as gonorrhea, chlamydia, UTI, discomfort of pregnancy, ectopic pregnancy, ovarian cysts, ovarian torsion, constipation, colitis, diverticulitis      Clinical Tests:   Lab Tests: Ordered and Reviewed       <> Summary of Lab: UA  normal    Urgent Care Management:  Patient presents with complaints of lower abdominal pain. On exam, she is afebrile and nontoxic appearing. There is no abdominal tenderness noted on exam. There is an open lesion noted to the right labia majora. I am unable to tell if this is remnants of an abscess as the patient describes having a week ago or if this is a herpetic lesion. I did do a PCR test and also obtained vaginosis swab for the vaginal discharge. She was encouraged safe sex practices and close follow-up with her GYN as planned. Naproxen prescribed for the abdominal pain likely related tot he ovarian cysts. ED precautions discussed, she verbalized understanding and agreed with plan.        Patient Instructions   You must understand that you've received an Urgent Care treatment only and that you may be released before all your medical problems are known or treated. You, the patient, will arrange for follow up care as instructed.      Follow up with your PCP or specialty clinic as instructed in the next 2-3 days if not improved or as needed. You can call [phone] to schedule an appointment with appropriate provider.      If you condition worsens, we recommend that you receive another evaluation at the emergency room immediately or contact your primary medical clinic's after hours call service to discuss your concerns.      Please return here or go to the Emergency Department for any concerns or worsening condition.      If you were prescribed a narcotic or controlled substance, do not drive or operate heavy equipment or machinery while taking these medications.

## 2023-01-26 NOTE — PATIENT INSTRUCTIONS
You must understand that you've received an Urgent Care treatment only and that you may be released before all your medical problems are known or treated. You, the patient, will arrange for follow up care as instructed.      Follow up with your PCP or specialty clinic as instructed in the next 2-3 days if not improved or as needed. You can call [phone] to schedule an appointment with appropriate provider.      If you condition worsens, we recommend that you receive another evaluation at the emergency room immediately or contact your primary medical clinic's after hours call service to discuss your concerns.      Please return here or go to the Emergency Department for any concerns or worsening condition.      If you were prescribed a narcotic or controlled substance, do not drive or operate heavy equipment or machinery while taking these medications.

## 2023-01-28 LAB
HSV1 DNA SPEC QL NAA+PROBE: NEGATIVE
HSV2 DNA SPEC QL NAA+PROBE: NEGATIVE
SPECIMEN SOURCE: NORMAL

## 2023-01-30 ENCOUNTER — TELEPHONE (OUTPATIENT)
Dept: URGENT CARE | Facility: CLINIC | Age: 23
End: 2023-01-30
Payer: MEDICAID

## 2023-01-30 NOTE — TELEPHONE ENCOUNTER
No answer and no voicemail box is setup. Attempting to inform of negative HSV PCR results. Vaginosis swab still pending.

## 2023-01-31 LAB
A VAGINAE DNA VAG QL NAA+PROBE: NORMAL SCORE
BVAB2 DNA VAG QL NAA+PROBE: NORMAL SCORE
C ALBICANS DNA VAG QL NAA+PROBE: NEGATIVE
C GLABRATA DNA VAG QL NAA+PROBE: NEGATIVE
MEGA1 DNA VAG QL NAA+PROBE: NORMAL SCORE
T VAGINALIS DNA VAG QL NAA+PROBE: NEGATIVE

## 2023-02-01 ENCOUNTER — TELEPHONE (OUTPATIENT)
Dept: URGENT CARE | Facility: CLINIC | Age: 23
End: 2023-02-01
Payer: MEDICAID

## 2023-02-06 ENCOUNTER — OFFICE VISIT (OUTPATIENT)
Dept: FAMILY MEDICINE | Facility: HOSPITAL | Age: 23
End: 2023-02-06
Payer: MEDICAID

## 2023-02-06 ENCOUNTER — OFFICE VISIT (OUTPATIENT)
Dept: URGENT CARE | Facility: CLINIC | Age: 23
End: 2023-02-06
Payer: MEDICAID

## 2023-02-06 VITALS
DIASTOLIC BLOOD PRESSURE: 96 MMHG | BODY MASS INDEX: 28.35 KG/M2 | RESPIRATION RATE: 18 BRPM | SYSTOLIC BLOOD PRESSURE: 105 MMHG | WEIGHT: 160 LBS | HEART RATE: 85 BPM | HEIGHT: 63 IN | OXYGEN SATURATION: 98 % | TEMPERATURE: 97 F

## 2023-02-06 DIAGNOSIS — F32.89 OTHER SPECIFIED DEPRESSIVE EPISODES: Primary | ICD-10-CM

## 2023-02-06 DIAGNOSIS — J02.9 SORE THROAT: ICD-10-CM

## 2023-02-06 DIAGNOSIS — J02.9 VIRAL PHARYNGITIS: Primary | ICD-10-CM

## 2023-02-06 LAB
CTP QC/QA: YES
MOLECULAR STREP A: NEGATIVE

## 2023-02-06 PROCEDURE — 3080F DIAST BP >= 90 MM HG: CPT | Mod: CPTII,S$GLB,, | Performed by: PHYSICIAN ASSISTANT

## 2023-02-06 PROCEDURE — 87651 POCT STREP A MOLECULAR: ICD-10-PCS | Mod: QW,S$GLB,, | Performed by: PHYSICIAN ASSISTANT

## 2023-02-06 PROCEDURE — 99211 OFF/OP EST MAY X REQ PHY/QHP: CPT | Performed by: PSYCHOLOGIST

## 2023-02-06 PROCEDURE — 3074F SYST BP LT 130 MM HG: CPT | Mod: CPTII,S$GLB,, | Performed by: PHYSICIAN ASSISTANT

## 2023-02-06 PROCEDURE — 1160F RVW MEDS BY RX/DR IN RCRD: CPT | Mod: CPTII,S$GLB,, | Performed by: PHYSICIAN ASSISTANT

## 2023-02-06 PROCEDURE — 3008F PR BODY MASS INDEX (BMI) DOCUMENTED: ICD-10-PCS | Mod: CPTII,S$GLB,, | Performed by: PHYSICIAN ASSISTANT

## 2023-02-06 PROCEDURE — 99213 PR OFFICE/OUTPT VISIT, EST, LEVL III, 20-29 MIN: ICD-10-PCS | Mod: S$GLB,,, | Performed by: PHYSICIAN ASSISTANT

## 2023-02-06 PROCEDURE — 1160F PR REVIEW ALL MEDS BY PRESCRIBER/CLIN PHARMACIST DOCUMENTED: ICD-10-PCS | Mod: CPTII,S$GLB,, | Performed by: PHYSICIAN ASSISTANT

## 2023-02-06 PROCEDURE — 3008F BODY MASS INDEX DOCD: CPT | Mod: CPTII,S$GLB,, | Performed by: PHYSICIAN ASSISTANT

## 2023-02-06 PROCEDURE — 87651 STREP A DNA AMP PROBE: CPT | Mod: QW,S$GLB,, | Performed by: PHYSICIAN ASSISTANT

## 2023-02-06 PROCEDURE — 1159F PR MEDICATION LIST DOCUMENTED IN MEDICAL RECORD: ICD-10-PCS | Mod: CPTII,S$GLB,, | Performed by: PHYSICIAN ASSISTANT

## 2023-02-06 PROCEDURE — 99213 OFFICE O/P EST LOW 20 MIN: CPT | Mod: S$GLB,,, | Performed by: PHYSICIAN ASSISTANT

## 2023-02-06 PROCEDURE — 3074F PR MOST RECENT SYSTOLIC BLOOD PRESSURE < 130 MM HG: ICD-10-PCS | Mod: CPTII,S$GLB,, | Performed by: PHYSICIAN ASSISTANT

## 2023-02-06 PROCEDURE — 1159F MED LIST DOCD IN RCRD: CPT | Mod: CPTII,S$GLB,, | Performed by: PHYSICIAN ASSISTANT

## 2023-02-06 PROCEDURE — 3080F PR MOST RECENT DIASTOLIC BLOOD PRESSURE >= 90 MM HG: ICD-10-PCS | Mod: CPTII,S$GLB,, | Performed by: PHYSICIAN ASSISTANT

## 2023-02-06 RX ORDER — PREDNISONE 20 MG/1
20 TABLET ORAL DAILY
Qty: 4 TABLET | Refills: 0 | Status: SHIPPED | OUTPATIENT
Start: 2023-02-06 | End: 2023-02-10

## 2023-02-06 NOTE — PROGRESS NOTES
"Subjective:       Patient ID: Kevyn Arnold is a 22 y.o. female.    Vitals:  height is 5' 3" (1.6 m) and weight is 72.6 kg (160 lb). Her oral temperature is 97.1 °F (36.2 °C). Her blood pressure is 105/96 (abnormal) and her pulse is 85. Her respiration is 18 and oxygen saturation is 98%.     Chief Complaint: Sore Throat    PT states that she sees white pus on the walls of her throat. Pt also states that she gets this often. Pt also denies getting testing done.    Patient provider note starts here:  Patient presents with complaints of a sore throat and white exudate noted to her tonsils since yesterday.  Reports that she gets this fairly often.  Also reports slight cough and congestion.  Denies any known ill contacts, fever, body aches, nausea, vomiting or diarrhea.  She did not take any medications for her symptoms.    Sore Throat   This is a new problem. The current episode started yesterday. The problem has been gradually worsening. Neither side of throat is experiencing more pain than the other. There has been no fever. The pain is at a severity of 10/10. Associated symptoms include congestion, coughing and headaches. Pertinent negatives include no abdominal pain, diarrhea, neck pain, shortness of breath, trouble swallowing (Pain with swallowing but tolerates secretions well) or vomiting. She has had no exposure to strep. She has tried nothing for the symptoms. The treatment provided no relief.     Constitution: Negative for fever.   HENT:  Positive for congestion and sore throat. Negative for trouble swallowing (Pain with swallowing but tolerates secretions well).    Neck: Negative for neck pain.   Cardiovascular:  Negative for chest pain, palpitations and sob on exertion.   Respiratory:  Positive for cough. Negative for chest tightness, sputum production, shortness of breath and wheezing.    Gastrointestinal:  Negative for abdominal pain, vomiting and diarrhea.   Skin:  Negative for color change and wound. "   Neurological:  Positive for headaches. Negative for numbness and tingling.     Objective:      Physical Exam   Constitutional: She is oriented to person, place, and time. She appears well-developed. She is cooperative.  Non-toxic appearance. She does not appear ill. No distress.   HENT:   Head: Normocephalic and atraumatic.   Ears:   Right Ear: Hearing, tympanic membrane, external ear and ear canal normal.   Left Ear: Hearing, tympanic membrane, external ear and ear canal normal.   Nose: Nose normal. No mucosal edema, rhinorrhea, nasal deformity or congestion. No epistaxis. Right sinus exhibits no maxillary sinus tenderness and no frontal sinus tenderness. Left sinus exhibits no maxillary sinus tenderness and no frontal sinus tenderness.   Mouth/Throat: Uvula is midline and mucous membranes are normal. No trismus in the jaw. Normal dentition. No uvula swelling. Posterior oropharyngeal erythema present. No oropharyngeal exudate or posterior oropharyngeal edema.   Eyes: Conjunctivae and lids are normal. No scleral icterus.   Neck: Trachea normal and phonation normal. Neck supple. No edema present. No erythema present. No neck rigidity present.   Cardiovascular: Normal rate, regular rhythm, normal heart sounds and normal pulses.   Pulmonary/Chest: Effort normal and breath sounds normal. No respiratory distress. She has no decreased breath sounds. She has no wheezes. She has no rhonchi.   Abdominal: Normal appearance.   Musculoskeletal: Normal range of motion.         General: No deformity. Normal range of motion.   Lymphadenopathy:     She has cervical adenopathy.   Neurological: She is alert and oriented to person, place, and time. She exhibits normal muscle tone. Coordination normal.   Skin: Skin is warm, dry, intact, not diaphoretic and not pale.   Psychiatric: Her speech is normal and behavior is normal. Judgment and thought content normal.   Nursing note and vitals reviewed.      Assessment:       1. Viral  pharyngitis    2. Sore throat        Results for orders placed or performed in visit on 02/06/23   POCT Strep A, Molecular   Result Value Ref Range    Molecular Strep A, POC Negative Negative     Acceptable Yes        Plan:         Viral pharyngitis    Sore throat  -     POCT Strep A, Molecular  -     predniSONE (DELTASONE) 20 MG tablet; Take 1 tablet (20 mg total) by mouth once daily. for 4 days  Dispense: 4 tablet; Refill: 0           Medical Decision Making:   History:   Old Medical Records: I decided to obtain old medical records.  Old Records Summarized: records from clinic visits.       <> Summary of Records: Recently evaluated in the urgent care for abdominal pain 2/2 ovarian cysts  Differential Diagnosis:   Differential diagnosis includes but is not limited to: viral vs bacterial URI, pharyngitis, otitis, COVID 19, influenza, pneumonia.    Clinical Tests:   Lab Tests: Ordered and Reviewed       <> Summary of Lab: Strep negative  Urgent Care Management:  I have reviewed past medical records including labs and imaging to evaluate for trends and previous treatments for related symptoms.   Patient presents with complaints of a sore throat with mild congestion and cough. On exam, she is afebrile and nontoxic appearing. There is erythema to the posterior oropharynx without exudate. She does have cervical adenopathy. Strep is negative. She was prescribed Prednisone for symptomatic treatment. She verbalized understanding and agreed with plan.    Patient Instructions   If not allergic,take tylenol (acetominophen) for fever control, chills, or body aches every 4 hours. Do not exceed 4000 mg/ day.If not allergic, take Motrin (Ibuprofen) every 4 hours for fever, chills, pain or inflammation. Do not exceed 2400 mg/day. You can alternate taking tylenol and motrin.     You must understand that you've received an Urgent Care treatment only and that you may be released before all your medical problems are  known or treated. You, the patient, will arrange for follow up care as instructed.      Follow up with your PCP or specialty clinic as instructed in the next 2-3 days if not improved or as needed. You can call [phone] to schedule an appointment with appropriate provider.      If you condition worsens, we recommend that you receive another evaluation at the emergency room immediately or contact your primary medical clinic's after hours call service to discuss your concerns.      Please return here or go to the Emergency Department for any concerns or worsening condition.      If you were prescribed a narcotic or controlled substance, do not drive or operate heavy equipment or machinery while taking these medications.

## 2023-02-06 NOTE — PROGRESS NOTES
Patient discussed the rigors of her academic program. Patient was appropriately dressed for her session and had good hygiene. She displayed depressed mood and flat affect, with no unusual movements or psychomotor changes. She utilized speech that was normal with regard to rate, tone, and volume, without pressure. She maintained appropriate eye contact and displayed thought processes that were clear, coherent, and organized. Practitioner provided empathy and support while exploring patient's rigors. Patient expressed her belief that she learns things at a slower pace. Patient noted the pace is intense and the material is new which causes her frustration. Practitioner validated patient's emotions and inquired what would be helpful to patient. Patient noted she has begun utilizing flashcards and indicated this study method is helpful. 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 Patient stated she does not feel supported or appreciated by her family. When asked what she would need to feel appreciated, patient noted she would like acknowledgement of her efforts. Patient also noted she would like to learn to love and appreciate herself. She then noted she would like to learn how to acknowledge her accomplishments and efforts in order to love herself. She mentioned she previously engaged in self-care, but no longer enjoys these activities as she is focused on schoolwork and beginning a career. Practitioner reviewed patient's goals. Patient replied she wanted to gain an understanding of herself. Patient noted she was not affectionate and expressed her belief that this lack of affection was due to her parents not being affectionate or physically comforting. Patient expressed her belief that her relationship with her parents negatively affects the relationships she has with others, particularly  her own son. Patient noted she would like to further explore her relationship with her parents, beginning with her mother. Patient and practitioner will meet on March 16, 2023 at 1:30 pm to continue exploring patient's familial relationships.

## 2023-02-06 NOTE — PATIENT INSTRUCTIONS
If not allergic,take tylenol (acetominophen) for fever control, chills, or body aches every 4 hours. Do not exceed 4000 mg/ day.If not allergic, take Motrin (Ibuprofen) every 4 hours for fever, chills, pain or inflammation. Do not exceed 2400 mg/day. You can alternate taking tylenol and motrin.     You must understand that you've received an Urgent Care treatment only and that you may be released before all your medical problems are known or treated. You, the patient, will arrange for follow up care as instructed.      Follow up with your PCP or specialty clinic as instructed in the next 2-3 days if not improved or as needed. You can call [phone] to schedule an appointment with appropriate provider.      If you condition worsens, we recommend that you receive another evaluation at the emergency room immediately or contact your primary medical clinic's after hours call service to discuss your concerns.      Please return here or go to the Emergency Department for any concerns or worsening condition.      If you were prescribed a narcotic or controlled substance, do not drive or operate heavy equipment or machinery while taking these medications.

## 2023-02-14 ENCOUNTER — TELEPHONE (OUTPATIENT)
Dept: OBSTETRICS AND GYNECOLOGY | Facility: CLINIC | Age: 23
End: 2023-02-14

## 2023-02-14 NOTE — TELEPHONE ENCOUNTER
----- Message from Yolande Tinoco, JAMIE-C sent at 2/14/2023 12:18 PM CST -----  Hi, I sent a result message stating she needed an appointment with MD provider for recurring ovarian cyst, it looks like she was scheduled with me instead. Please call and schedule with MD provider. Thanks.

## 2023-03-01 ENCOUNTER — TELEPHONE (OUTPATIENT)
Dept: OBSTETRICS AND GYNECOLOGY | Facility: CLINIC | Age: 23
End: 2023-03-01
Payer: MEDICAID

## 2023-03-01 NOTE — TELEPHONE ENCOUNTER
----- Message from Alexa Mcdonald sent at 3/1/2023  9:08 AM CST -----  Type:  Needs Medical Advice    Who Called: pt  Symptoms (please be specific): pt has a appointment scheduled for 03/01/23@2:00 and the pt is requesting to get a phone call to discuss the reason for the visit instead      Would the patient rather a call back or a response via [facility]? call  Best Call Back Number: [phone]  Additional Information:

## 2023-03-01 NOTE — TELEPHONE ENCOUNTER
Called spoke to pt, She states she understands she has ovarian cyst and needs a consult to discuss next steps. She say she is unable to make todays appointment would like to discuss options via phone or virtual visit. Discussed we can see about setting up a virtual visit at a later date, someone will call to schedule, if she hasn't heard from us in about 2 days to contact office. She verbalizes understanding. Message sent for scheduling.

## 2023-03-16 ENCOUNTER — OFFICE VISIT (OUTPATIENT)
Dept: FAMILY MEDICINE | Facility: HOSPITAL | Age: 23
End: 2023-03-16
Payer: MEDICAID

## 2023-03-16 DIAGNOSIS — F41.8 OTHER SPECIFIED ANXIETY DISORDERS: Primary | ICD-10-CM

## 2023-03-16 PROCEDURE — 99211 OFF/OP EST MAY X REQ PHY/QHP: CPT | Performed by: PSYCHOLOGIST

## 2023-03-16 NOTE — PROGRESS NOTES
"Patient discussed her desire to better understand herself. Patient was appropriately dressed for her session and had good hygiene. She displayed calm mood and jovial affect, with no unusual movements or psychomotor changes. She utilized speech that was normal with regard to rate, tone, and volume, without pressure. She maintained appropriate eye contact and displayed thought processes that were clear, coherent, and organized. Practitioner provided empathy and support while exploring patient's self-understanding journey. Patient stated she did not pass her previous academic semester and discussed her plans to enroll in different classes in the summer and fall semesters. She stated she had a "breakdown" following the semester, during which she was unmotivated to complete most tasks, during the span of one week. Patient mentioned her family encouraged her to complete an academic program that will lead to future job stability. Patient discussed her distaste with school and discussed her academic difficulties. Patient noted she is unsure of what career she would like to pursue. Patient discussed her current job and noted her employment is going well. She also discussed that she often second guesses herself and provided an example of checking the amount of change she provides customers. Patient expressed her desire to have self-confidence and noted she would like to own her own business but expressed doubt in her ability to do so. Practitioner validated patient's concerns and explored patient's desire to own her own business. Patient stated she often procrastinates with completing tasks and does not see projects to completion. 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 Patient then enrolled in school, with the goal of completing her program and entering into a profession with stability. Patient stated she did not pass her last " semester and now she is currently working, as she no longer has to devote all of her time into her studies. Practitioner validated patient's concerns and reflected that patient seems to lack perseverance. Patient agreed. Practitioner inquired about patient's goals. Patient highlighted her uncertainty but noted she enjoyed makeup. Patient noted she recently had business cards created on her behalf. Practitioner inquired if there were any upcoming opportunities that patient could utilize to provide her services. Patient stated she was unsure. Patient and practitioner will meet on April 3, 2023, at 1:30 pm to continue exploring patient's self-understanding, particularly regarding patient's career goals.

## 2023-03-23 ENCOUNTER — PATIENT MESSAGE (OUTPATIENT)
Dept: FAMILY MEDICINE | Facility: HOSPITAL | Age: 23
End: 2023-03-23
Payer: MEDICAID

## 2023-05-16 ENCOUNTER — PATIENT MESSAGE (OUTPATIENT)
Dept: FAMILY MEDICINE | Facility: HOSPITAL | Age: 23
End: 2023-05-16
Payer: MEDICAID

## 2023-05-18 ENCOUNTER — TELEPHONE (OUTPATIENT)
Dept: PSYCHIATRY | Facility: HOSPITAL | Age: 23
End: 2023-05-18
Payer: MEDICAID

## 2023-05-18 ENCOUNTER — OFFICE VISIT (OUTPATIENT)
Dept: FAMILY MEDICINE | Facility: HOSPITAL | Age: 23
End: 2023-05-18
Payer: MEDICAID

## 2023-05-18 VITALS
HEIGHT: 63 IN | BODY MASS INDEX: 30.9 KG/M2 | HEART RATE: 63 BPM | DIASTOLIC BLOOD PRESSURE: 62 MMHG | SYSTOLIC BLOOD PRESSURE: 113 MMHG | WEIGHT: 174.38 LBS

## 2023-05-18 DIAGNOSIS — F41.1 GAD (GENERALIZED ANXIETY DISORDER): ICD-10-CM

## 2023-05-18 DIAGNOSIS — G43.109 MIGRAINE WITH AURA AND WITHOUT STATUS MIGRAINOSUS, NOT INTRACTABLE: Primary | ICD-10-CM

## 2023-05-18 PROCEDURE — 99213 OFFICE O/P EST LOW 20 MIN: CPT | Performed by: STUDENT IN AN ORGANIZED HEALTH CARE EDUCATION/TRAINING PROGRAM

## 2023-05-18 RX ORDER — RIZATRIPTAN BENZOATE 10 MG/1
10 TABLET ORAL
Qty: 30 TABLET | Refills: 0 | Status: SHIPPED | OUTPATIENT
Start: 2023-05-18 | End: 2023-09-14

## 2023-05-18 RX ORDER — IBUPROFEN 800 MG/1
800 TABLET ORAL 3 TIMES DAILY PRN
Qty: 30 TABLET | Refills: 2 | Status: SHIPPED | OUTPATIENT
Start: 2023-05-18 | End: 2023-09-14 | Stop reason: SDUPTHER

## 2023-05-18 RX ORDER — ESCITALOPRAM OXALATE 20 MG/1
20 TABLET ORAL DAILY
Qty: 30 TABLET | Refills: 11 | Status: SHIPPED | OUTPATIENT
Start: 2023-05-18 | End: 2024-05-17

## 2023-05-18 NOTE — PROGRESS NOTES
PROGRESS NOTE  [facility]    Subjective:       Patient ID: Kevyn Arnold is a 22 y.o. female.    Chief Complaint: No chief complaint on file.      22-year-old female past medical history of uncontrolled migraines, GERD, MDD here for follow-up.  Was last seen 6 months ago when she was started on Imitrex, Lexapro, propranolol.  Patient did not start taking Lexapro until January, took it every day for a brief period of time where she noticed some positive change in her mood and then started taking it intermittently.  Takes Lexapro every other day now and has had worsening symptoms of anxiety and mood swings.  Patient was seen therapist regularly which she said helped however had missed a couple appointments and was taken off of therapists panel.  Is interested in reestablishing.      Patient took Imitrex once and felt light headed and did not try medication again. Patient states that light-headedness is a symptom of her migraines.  Patient still has migraines 3 to 4 times a week sometimes lasting for a day.  Patient never took propranolol.  Was told that it would affect her heart and she did not want to take it.  Stressed to patient that propranolol could help prevent migraines and that is not for her heart but for headaches at this time.  Still takes Advil and Tylenol with some relief.  Notices that her cycle worsens her migraines.  Sometimes also notices abrupt changes in head position can induce migraine.  No other identifiable triggers at this time.    Review of Systems   Constitutional:  Negative for unexpected weight change.   HENT:  Negative for congestion, rhinorrhea, sneezing and sore throat.    Eyes:  Negative for redness.   Respiratory:  Negative for cough and shortness of breath.    Cardiovascular:  Negative for chest pain.   Gastrointestinal:  Negative for abdominal pain, constipation and diarrhea.   Genitourinary:  Negative for pelvic pain.   Musculoskeletal:  Negative for arthralgias and  joint swelling.   Skin:  Negative for color change and pallor.   Psychiatric/Behavioral:  The patient is nervous/anxious.      Objective:      Vitals:    05/18/23 1406   BP: 113/62   Pulse: 63     Body mass index is 30.89 kg/m².  Physical Exam  Vitals and nursing note reviewed.   Constitutional:       Appearance: Normal appearance.   HENT:      Head: Normocephalic and atraumatic.      Right Ear: Tympanic membrane normal.      Left Ear: Tympanic membrane normal.      Mouth/Throat:      Mouth: Mucous membranes are moist.      Pharynx: Oropharynx is clear.   Eyes:      Extraocular Movements: Extraocular movements intact.      Pupils: Pupils are equal, round, and reactive to light.   Cardiovascular:      Rate and Rhythm: Normal rate and regular rhythm.      Pulses: Normal pulses.      Heart sounds: Normal heart sounds.   Pulmonary:      Effort: Pulmonary effort is normal.      Breath sounds: Normal breath sounds.   Abdominal:      General: Abdomen is flat.      Palpations: Abdomen is soft.   Musculoskeletal:         General: Normal range of motion.      Cervical back: Normal range of motion.   Skin:     General: Skin is warm.   Neurological:      General: No focal deficit present.      Mental Status: She is alert and oriented to person, place, and time.   Psychiatric:         Mood and Affect: Mood normal.      Comments: PHQ 9: 22  BRENDA 7: 19       Assessment:       1. Migraine with aura and without status migrainosus, not intractable    2. BRENDA (generalized anxiety disorder)        22-year-old female with above past medical history, currently uncontrolled migraines, anxiety, depression will need to adjust current regimen    Plan:       Will switch from sumatriptan to rizatriptan, encouraged patient to take Triptan with NSAID and Tylenol altogether at migraine onset.  Cautioned the patient not to take more than 2 triptans in 24 hours, at least 2 hours apart.  Will increase Lexapro to 20 mg, stressed importance of taking  medication daily.  Also encouraged patient to take propranolol for migraine prophylaxis and for anxiety.  Will also refer patient to Psychology for therapy.    Migraine with aura and without status migrainosus, not intractable  -     rizatriptan (MAXALT) 10 MG tablet; Take 1 tablet (10 mg total) by mouth as needed for Migraine (take 1 tablet at onset of migraine and another after 2 hours if migraine does not improve. Do not take more than 2 in a day).  Dispense: 30 tablet; Refill: 0  -     ibuprofen (ADVIL,MOTRIN) 800 MG tablet; Take 1 tablet (800 mg total) by mouth 3 (three) times daily as needed for Pain.  Dispense: 30 tablet; Refill: 2    BRENDA (generalized anxiety disorder)  -     Ambulatory referral/consult to Psychology; Future; Expected date: 05/25/2023  -     EScitalopram oxalate (LEXAPRO) 20 MG tablet; Take 1 tablet (20 mg total) by mouth once daily.  Dispense: 30 tablet; Refill: 11        Follow up in: 2-4 weeks        Jorge Luis Patel MD, MPH  [facility], PGY-2    This note was partially created using [employer] Voice Recognition software. Typographical and content errors may occur with this process. While efforts are made to detect and correct such errors, in some cases errors will persist. For this reason, wording in this document should be considered in the proper context and not strictly verbatim.

## 2023-05-18 NOTE — TELEPHONE ENCOUNTER
Practitioner called patient after patient's PCP inquired about patient returning to practitioner's schedule. Patient had been terminated due to lack of attendance. Practitioner called patient to inquire if patient had reached out to referrals listed on her termination summary. Patient stated she had not received the summary. Practitioner stated the summary was sent through letter and through patient portal / my chart. Patient stated she is attempting to reestablish her my chart. Patient stated she no longer needed a counselor, noting she prefers to receive all care in one location. Patient had an appointment set up prior to practitioner consultation and approval.

## 2023-09-14 ENCOUNTER — OFFICE VISIT (OUTPATIENT)
Dept: FAMILY MEDICINE | Facility: HOSPITAL | Age: 23
End: 2023-09-14

## 2023-09-14 VITALS
HEART RATE: 89 BPM | DIASTOLIC BLOOD PRESSURE: 72 MMHG | BODY MASS INDEX: 32.15 KG/M2 | SYSTOLIC BLOOD PRESSURE: 114 MMHG | HEIGHT: 63 IN | WEIGHT: 181.44 LBS

## 2023-09-14 DIAGNOSIS — G43.109 MIGRAINE WITH AURA AND WITHOUT STATUS MIGRAINOSUS, NOT INTRACTABLE: Primary | ICD-10-CM

## 2023-09-14 PROCEDURE — 99213 OFFICE O/P EST LOW 20 MIN: CPT | Performed by: STUDENT IN AN ORGANIZED HEALTH CARE EDUCATION/TRAINING PROGRAM

## 2023-09-14 RX ORDER — RIMEGEPANT SULFATE 75 MG/75MG
75 TABLET, ORALLY DISINTEGRATING ORAL DAILY PRN
Qty: 14 TABLET | Refills: 1 | Status: SHIPPED | OUTPATIENT
Start: 2023-09-14

## 2023-09-14 RX ORDER — IBUPROFEN 800 MG/1
800 TABLET ORAL 3 TIMES DAILY PRN
Qty: 30 TABLET | Refills: 2 | Status: SHIPPED | OUTPATIENT
Start: 2023-09-14

## 2023-09-15 NOTE — PROGRESS NOTES
PROGRESS NOTE  \[facility]\"" FAMILY MEDICINE    Subjective:       Patient ID: Kevyn Arnold is a 22 y.o. female.    Chief Complaint: Follow-up      23 yo F with pmh BRENDA, MDD, migraines who presents to clinic for follow up. Pt has continued anxiety over her future. Has had difficulties with adherence to her SSRI, feels like taking medication makes her lesser. Discussed her hesitance to take her medications and performed motivational interview and recommended therapy which pt was ammenable to an stated she would endeavor to have daily adherance to her lexapro. In regards to her migraines she has had good results with propanolol, now only having 1 migraine a week vs 3. Still having day long migraines. Maxalt with nsaid and tylenolol has not been helpful in treating migraines. Now has failed two triptans.      Review of Systems   Constitutional:  Negative for chills, fever and unexpected weight change.   HENT:  Negative for congestion, rhinorrhea, sneezing and sore throat.    Eyes:  Negative for redness and visual disturbance.   Respiratory:  Negative for cough, shortness of breath and wheezing.    Cardiovascular:  Negative for chest pain and leg swelling.   Gastrointestinal:  Negative for abdominal pain, blood in stool, constipation and diarrhea.   Genitourinary:  Negative for dysuria and hematuria.   Musculoskeletal:  Negative for arthralgias and joint swelling.   Skin:  Negative for color change and pallor.   Neurological:  Positive for headaches. Negative for light-headedness.   Psychiatric/Behavioral:  Negative for agitation and confusion. The patient is nervous/anxious.        Objective:      Vitals:    09/14/23 1514   BP: 114/72   Pulse: 89     Body mass index is 32.14 kg/m².  Physical Exam  Vitals and nursing note reviewed.   Constitutional:       Appearance: Normal appearance.   HENT:      Head: Normocephalic and atraumatic.      Right Ear: Tympanic membrane normal.      Left Ear: Tympanic membrane normal.       Mouth/Throat:      Mouth: Mucous membranes are moist.      Pharynx: Oropharynx is clear.   Eyes:      Extraocular Movements: Extraocular movements intact.      Pupils: Pupils are equal, round, and reactive to light.   Cardiovascular:      Rate and Rhythm: Normal rate and regular rhythm.      Pulses: Normal pulses.      Heart sounds: Normal heart sounds.   Pulmonary:      Effort: Pulmonary effort is normal.      Breath sounds: Normal breath sounds.   Abdominal:      General: Abdomen is flat.      Palpations: Abdomen is soft.   Musculoskeletal:         General: Normal range of motion.      Cervical back: Normal range of motion.   Skin:     General: Skin is warm.   Neurological:      General: No focal deficit present.      Mental Status: She is alert and oriented to person, place, and time.   Psychiatric:         Mood and Affect: Mood normal.         Assessment:       1. Migraine with aura and without status migrainosus, not intractable        23 yo F with above pmh with continued uncontrolled migraine and BRENDA/MDD, migraine improved with propanolol, limited adherance to SSRI  Plan:       Encouraged daily adherance to SSRI for maximum efficacy, also gave resources for therapy. Pt now has failed therapy on two triptans, imitrex and maxalt, continues to have debilitating migraines > 4 times a month despite being on prophylactic propanolol. Warrants treatment with Nurtec.    Migraine with aura and without status migrainosus, not intractable  -     rimegepant (NURTEC) 75 mg odt; Take 1 tablet (75 mg total) by mouth daily as needed for Migraine. Place ODT tablet on the tongue; alternatively the ODT tablet may be placed under the tongue  Dispense: 14 tablet; Refill: 1  -     ibuprofen (ADVIL,MOTRIN) 800 MG tablet; Take 1 tablet (800 mg total) by mouth 3 (three) times daily as needed for Pain.  Dispense: 30 tablet; Refill: 2        Follow up in: 1 month        Jorge Luis Patel MD, MPH  [facility], PGY-3    This note was  partially created using [employer] Voice Recognition software. Typographical and content errors may occur with this process. While efforts are made to detect and correct such errors, in some cases errors will persist. For this reason, wording in this document should be considered in the proper context and not strictly verbatim.

## 2023-09-21 NOTE — PROGRESS NOTES
I assume primary medical responsibility for this patient. I have reviewed the history, physical, and assessement & treatment plan with the resident and agree that the care is reasonable and necessary. This service has been performed by a resident without the presence of a teaching physician under the primary care exception. If necessary, an addendum of additional findings or evaluation beyond the resident documentation will be noted below.      Bettina Vernon MD    [facility]

## 2024-01-31 ENCOUNTER — PATIENT MESSAGE (OUTPATIENT)
Dept: OBSTETRICS AND GYNECOLOGY | Facility: CLINIC | Age: 24
End: 2024-01-31

## 2024-04-30 ENCOUNTER — TELEPHONE (OUTPATIENT)
Dept: OBSTETRICS AND GYNECOLOGY | Facility: CLINIC | Age: 24
End: 2024-04-30

## 2024-04-30 NOTE — TELEPHONE ENCOUNTER
----- Message from Sarah Ambrocio sent at 4/30/2024 10:13 AM CDT -----  Regarding: pt called  Name of Who is Calling: JAYLON MONSALVE [[id number]]      What is the request in detail: pt is requesting to get her nexplanon birth control to be removed. Please advise       Can the clinic reply by [facility]: No       What Number [facility] Back [facility]: Telephone Information:         [phone]

## 2024-04-30 NOTE — TELEPHONE ENCOUNTER
Spoke with pt regarding nexplanon removal. Scheduled pt with Dr. Garrett in July.    Pt accepted appointment and had no further questions.

## 2024-07-03 ENCOUNTER — PROCEDURE VISIT (OUTPATIENT)
Dept: OBSTETRICS AND GYNECOLOGY | Facility: CLINIC | Age: 24
End: 2024-07-03

## 2024-07-03 VITALS
WEIGHT: 175.25 LBS | DIASTOLIC BLOOD PRESSURE: 69 MMHG | BODY MASS INDEX: 31.05 KG/M2 | SYSTOLIC BLOOD PRESSURE: 114 MMHG

## 2024-07-03 DIAGNOSIS — R68.89 SENSATION OF FEELING HOT: ICD-10-CM

## 2024-07-03 DIAGNOSIS — Z30.46 NEXPLANON REMOVAL: Primary | ICD-10-CM

## 2024-07-03 PROCEDURE — 11982 REMOVE DRUG IMPLANT DEVICE: CPT | Mod: PBBFAC,PO | Performed by: OBSTETRICS & GYNECOLOGY

## 2024-07-03 PROCEDURE — 99999PBSHW PR PBB SHADOW TECHNICAL ONLY FILED TO HB: Mod: PBBFAC,,,

## 2024-07-03 PROCEDURE — 81025 URINE PREGNANCY TEST: CPT | Mod: PBBFAC,PO | Performed by: OBSTETRICS & GYNECOLOGY

## 2024-07-03 NOTE — PROCEDURES
Procedure Nexplanon Removal:   Date: 7/3/2024    Nexplanon palpated beneath skin of LEFT arm; Nexplanon edges were marked with pen. Area for procedure was swabbed with betadine. 3 cc of 1% lidocaine was injected for local anesthesia. Scalpel was used to make 1cm incision beneath Nexplanon implant; Implant was then successfully removed with hemostats. Implant measured approximately 4cm in length. Area then covered with bandage. Patient tolerated the procedure well.    A/P:   Contraception management: s/p uncomplicated removal of nexplanon, declines other type of contraception for now  Feeling hot: TSH ordered      Will come back for annual exam, pap smear    ALEXIS gamez MD

## 2025-02-03 ENCOUNTER — OFFICE VISIT (OUTPATIENT)
Dept: OBSTETRICS AND GYNECOLOGY | Facility: CLINIC | Age: 25
End: 2025-02-03
Payer: MEDICAID

## 2025-02-03 VITALS
SYSTOLIC BLOOD PRESSURE: 114 MMHG | DIASTOLIC BLOOD PRESSURE: 78 MMHG | BODY MASS INDEX: 29.45 KG/M2 | WEIGHT: 166.25 LBS

## 2025-02-03 DIAGNOSIS — Z01.419 ENCOUNTER FOR WELL WOMAN EXAM WITH ROUTINE GYNECOLOGICAL EXAM: Primary | ICD-10-CM

## 2025-02-03 PROCEDURE — 99395 PREV VISIT EST AGE 18-39: CPT | Mod: S$PBB,,, | Performed by: OBSTETRICS & GYNECOLOGY

## 2025-02-03 PROCEDURE — 88175 CYTOPATH C/V AUTO FLUID REDO: CPT

## 2025-02-03 PROCEDURE — 81515 NFCT DS BV&VAGINITIS DNA ALG: CPT

## 2025-02-03 PROCEDURE — 99999 PR PBB SHADOW E&M-EST. PATIENT-LVL III: CPT | Mod: PBBFAC,,, | Performed by: OBSTETRICS & GYNECOLOGY

## 2025-02-03 PROCEDURE — 1159F MED LIST DOCD IN RCRD: CPT | Mod: CPTII,,, | Performed by: OBSTETRICS & GYNECOLOGY

## 2025-02-03 PROCEDURE — 3074F SYST BP LT 130 MM HG: CPT | Mod: CPTII,,, | Performed by: OBSTETRICS & GYNECOLOGY

## 2025-02-03 PROCEDURE — 3078F DIAST BP <80 MM HG: CPT | Mod: CPTII,,, | Performed by: OBSTETRICS & GYNECOLOGY

## 2025-02-03 PROCEDURE — 3008F BODY MASS INDEX DOCD: CPT | Mod: CPTII,,, | Performed by: OBSTETRICS & GYNECOLOGY

## 2025-02-03 PROCEDURE — 87591 N.GONORRHOEAE DNA AMP PROB: CPT

## 2025-02-03 PROCEDURE — 99213 OFFICE O/P EST LOW 20 MIN: CPT | Mod: PBBFAC,PO | Performed by: OBSTETRICS & GYNECOLOGY

## 2025-02-03 NOTE — PROGRESS NOTES
CC: Annual check-up    SUBJECTIVE:   24 y.o. female   for annual routine Pap and checkup. Patient's last menstrual period was 2025 (exact date)..  She has no unusual complaints besides vaginal discharge white with an odor. She is sexually active and states does not use condoms consistently.      Past Medical History:   Diagnosis Date    Anemia      Past Surgical History:   Procedure Laterality Date     SECTION       Social History     Socioeconomic History    Marital status: Single   Tobacco Use    Smoking status: Never    Smokeless tobacco: Never   Substance and Sexual Activity    Alcohol use: Not Currently    Drug use: Never    Sexual activity: Yes     Partners: Male     Family History   Problem Relation Name Age of Onset    No Known Problems Mother      No Known Problems Father      Diabetes Maternal Grandfather       OB History    Para Term  AB Living   1         1   SAB IAB Ectopic Multiple Live Births         0 1      # Outcome Date GA Lbr Nikhil/2nd Weight Sex Type Anes PTL Lv   1       CS-Unspec   IZABELA         Current Outpatient Medications   Medication Sig Dispense Refill    ibuprofen (ADVIL,MOTRIN) 800 MG tablet Take 1 tablet (800 mg total) by mouth 3 (three) times daily as needed for Pain. 30 tablet 2    EScitalopram oxalate (LEXAPRO) 20 MG tablet Take 1 tablet (20 mg total) by mouth once daily. 30 tablet 11    etonogestreL (NEXPLANON) 68 mg Impl subdermal device Inject 1 each into the skin. (Patient not taking: Reported on 2/3/2025)      fluconazole (DIFLUCAN) 150 MG Tab Take 1 tablet (150 mg total) by mouth once daily. Repeat in 3 days if symptoms do not improve (Patient not taking: Reported on 2/3/2025) 2 tablet 0    hyoscyamine (LEVSIN/SL) 0.125 mg Subl Place 1 tablet (0.125 mg total) under the tongue every 6 (six) hours as needed (Abdominal cramping). 20 tablet 0    meclizine (ANTIVERT) 25 mg tablet Take 1 tablet (25 mg total) by mouth 3 (three) times daily as  needed. (Patient not taking: Reported on 2/3/2025) 20 tablet 0    propranoloL (INDERAL) 20 MG tablet Take 0.5 tablets (10 mg total) by mouth 2 (two) times daily. 30 tablet 11    rimegepant (NURTEC) 75 mg odt Take 1 tablet (75 mg total) by mouth daily as needed for Migraine. Place ODT tablet on the tongue; alternatively the ODT tablet may be placed under the tongue (Patient not taking: Reported on 2/3/2025) 14 tablet 1     No current facility-administered medications for this visit.     Allergies: Patient has no known allergies.     ROS:  Constitutional: no weight loss, weight gain, fever, fatigue  Eyes:  No vision changes, glasses/contacts  ENT/Mouth: No ulcers, sinus problems, ears ringing, headache  Cardiovascular: No inability to lie flat, chest pain, exercise intolerance, swelling, heart palpitations  Respiratory: No wheezing, coughing blood, shortness of breath, or cough  Gastrointestinal: No diarrhea, bloody stool, nausea/vomiting, constipation, gas, hemorrhoids  Genitourinary: No blood in urine, painful urination, urgency of urination, frequency of urination, incomplete emptying, incontinence, abnormal bleeding, painful periods, heavy periods, vaginal discharge, vaginal odor, painful intercourse, sexual problems, bleeding after intercourse.  Musculoskeletal: No muscle weakness  Skin/Breast: No painful breasts, nipple discharge, masses, rash, ulcers  Neurological: No passing out, seizures, numbness, headache  Endocrine: No diabetes, hypothyroid, hyperthyroid, hot flashes, hair loss, abnormal hair growth, ance  Psychiatric: No depression, crying  Hematologic: No bruises, bleeding, swollen lymph nodes, anemia.      OBJECTIVE:   The patient appears well, alert, oriented x 3, in no distress.  /78   Wt 75.4 kg (166 lb 3.6 oz)   LMP 01/27/2025 (Exact Date)   BMI 29.45 kg/m²   NECK: no thyromegaly, trachea midline  SKIN: no acne, striae, hirsutism  BREAST EXAM: breasts appear normal, no suspicious masses, no  skin or nipple changes or axillary nodes  ABDOMEN: no hernias, masses, or hepatosplenomegaly  GENITALIA: normal external genitalia, no erythema, no discharge and vaginal discharge noted  URETHRA: normal urethra, normal urethral meatus  VAGINA: vaginal discharge copious, white, yellow, and malodorous  CERVIX: no lesions, mild cervical motion tenderness  UTERUS: normal  ADNEXA: no mass, fullness, tenderness      ASSESSMENT:   well woman  1. Encounter for well woman exam with routine gynecological exam        PLAN:   pap smear  return annually or prn  Orders Placed This Encounter    Vaginosis Screen by DNA Probe    C. trachomatis/N. gonorrhoeae by AMP DNA [facility]; Vagina    Liquid-Based Pap Smear, Screening     Eva Naranjo MD  [facility], PGY-3  02/03/2025

## 2025-02-06 LAB
BACTERIAL VAGINOSIS DNA: NOT DETECTED
C TRACH DNA SPEC QL NAA+PROBE: NOT DETECTED
CANDIDA GLABRATA/KRUSEI: NOT DETECTED
CANDIDA RRNA VAG QL PROBE: NOT DETECTED
N GONORRHOEA DNA SPEC QL NAA+PROBE: NOT DETECTED
TRICHOMONAS VAGINALIS: DETECTED

## 2025-03-14 ENCOUNTER — OFFICE VISIT (OUTPATIENT)
Dept: URGENT CARE | Facility: CLINIC | Age: 25
End: 2025-03-14
Payer: MEDICAID

## 2025-03-14 VITALS
HEART RATE: 77 BPM | HEIGHT: 63 IN | SYSTOLIC BLOOD PRESSURE: 107 MMHG | TEMPERATURE: 98 F | BODY MASS INDEX: 29.3 KG/M2 | RESPIRATION RATE: 16 BRPM | OXYGEN SATURATION: 98 % | WEIGHT: 165.38 LBS | DIASTOLIC BLOOD PRESSURE: 63 MMHG

## 2025-03-14 DIAGNOSIS — Z11.3 SCREENING EXAMINATION FOR STD (SEXUALLY TRANSMITTED DISEASE): ICD-10-CM

## 2025-03-14 DIAGNOSIS — R31.9 URINARY TRACT INFECTION WITH HEMATURIA, SITE UNSPECIFIED: Primary | ICD-10-CM

## 2025-03-14 DIAGNOSIS — N89.8 VAGINAL ITCHING: ICD-10-CM

## 2025-03-14 DIAGNOSIS — R35.0 URINARY FREQUENCY: ICD-10-CM

## 2025-03-14 DIAGNOSIS — N39.0 URINARY TRACT INFECTION WITH HEMATURIA, SITE UNSPECIFIED: Primary | ICD-10-CM

## 2025-03-14 LAB
B-HCG UR QL: NEGATIVE
BILIRUBIN, UA POC OHS: NEGATIVE
BLOOD, UA POC OHS: ABNORMAL
CLARITY, UA POC OHS: ABNORMAL
COLOR, UA POC OHS: YELLOW
CTP QC/QA: YES
GLUCOSE, UA POC OHS: NEGATIVE
HBV SURFACE AG SERPL QL IA: NORMAL
HCV AB SERPL QL IA: NORMAL
HIV 1+2 AB+HIV1 P24 AG SERPL QL IA: NORMAL
KETONES, UA POC OHS: NEGATIVE
LEUKOCYTES, UA POC OHS: ABNORMAL
NITRITE, UA POC OHS: NEGATIVE
PH, UA POC OHS: 6
PROTEIN, UA POC OHS: NEGATIVE
SPECIFIC GRAVITY, UA POC OHS: 1.01
TREPONEMA PALLIDUM IGG+IGM AB [PRESENCE] IN SERUM OR PLASMA BY IMMUNOASSAY: NONREACTIVE
UROBILINOGEN, UA POC OHS: 0.2

## 2025-03-14 PROCEDURE — 81515 NFCT DS BV&VAGINITIS DNA ALG: CPT | Performed by: NURSE PRACTITIONER

## 2025-03-14 PROCEDURE — 87591 N.GONORRHOEAE DNA AMP PROB: CPT | Performed by: NURSE PRACTITIONER

## 2025-03-14 PROCEDURE — 86803 HEPATITIS C AB TEST: CPT | Performed by: NURSE PRACTITIONER

## 2025-03-14 PROCEDURE — 87086 URINE CULTURE/COLONY COUNT: CPT | Performed by: NURSE PRACTITIONER

## 2025-03-14 PROCEDURE — 86593 SYPHILIS TEST NON-TREP QUANT: CPT | Performed by: NURSE PRACTITIONER

## 2025-03-14 PROCEDURE — 87340 HEPATITIS B SURFACE AG IA: CPT | Performed by: NURSE PRACTITIONER

## 2025-03-14 PROCEDURE — 87389 HIV-1 AG W/HIV-1&-2 AB AG IA: CPT | Performed by: NURSE PRACTITIONER

## 2025-03-14 RX ORDER — NITROFURANTOIN 25; 75 MG/1; MG/1
100 CAPSULE ORAL 2 TIMES DAILY
Qty: 10 CAPSULE | Refills: 0 | Status: SHIPPED | OUTPATIENT
Start: 2025-03-14 | End: 2025-03-19

## 2025-03-14 NOTE — PROGRESS NOTES
"Subjective:      Patient ID: Kevyn Arnold is a 24 y.o. female.    Vitals:  height is 5' 3" (1.6 m) and weight is 75 kg (165 lb 5.5 oz). Her oral temperature is 98 °F (36.7 °C). Her blood pressure is 107/63 and her pulse is 77. Her respiration is 16 and oxygen saturation is 98%.     Chief Complaint: Vaginal Discharge and STD CHECK    Pt present with urinary odor, vaginal discharge, cloudy urine. Sx started 2 months ago. Pt would like to get std panel.     Vaginal Discharge  The patient's primary symptoms include genital itching, a genital odor and vaginal discharge. The patient's pertinent negatives include no genital lesions, genital rash, missed menses, pelvic pain or vaginal bleeding. This is a recurrent problem. The current episode started more than 1 month ago. The problem occurs constantly. The patient is experiencing no pain. She is not pregnant. Associated symptoms include dysuria, frequency and urgency. Pertinent negatives include no abdominal pain, anorexia, back pain, chills, constipation, diarrhea, discolored urine, fever, flank pain, headaches, hematuria, joint pain, joint swelling, nausea, painful intercourse, rash, sore throat or vomiting. The vaginal discharge was yellow and milky. She has tried nothing for the symptoms. Her past medical history is significant for an STD.     Constitution: Negative for chills, fatigue and fever.   HENT:  Negative for sore throat.    Gastrointestinal:  Negative for abdominal pain, nausea, vomiting, constipation and diarrhea.   Genitourinary:  Positive for dysuria, frequency, urgency and vaginal discharge. Negative for flank pain, hematuria, missed menses, vaginal bleeding, vaginal odor and pelvic pain.   Musculoskeletal:  Negative for back pain.   Skin:  Negative for rash and lesion.   Neurological:  Negative for headaches.      Objective:     Physical Exam   Constitutional: She is oriented to person, place, and time. She appears well-developed.  Non-toxic " appearance. She does not appear ill. No distress.   HENT:   Head: Normocephalic and atraumatic.   Ears:   Right Ear: External ear normal.   Left Ear: External ear normal.   Nose: Nose normal. No nasal deformity. No epistaxis.   Mouth/Throat: Oropharynx is clear and moist and mucous membranes are normal.   Eyes: Lids are normal.   Neck: Trachea normal and phonation normal. Neck supple.   Cardiovascular: Normal pulses.   Pulmonary/Chest: Effort normal.   Abdominal: Normal appearance and bowel sounds are normal. She exhibits no distension. Soft. There is no abdominal tenderness. There is no left CVA tenderness and no right CVA tenderness.   Neurological: She is alert and oriented to person, place, and time.   Skin: Skin is warm, dry, intact and not diaphoretic.   Psychiatric: Her speech is normal and behavior is normal.   Nursing note and vitals reviewed.    Results for orders placed or performed in visit on 03/14/25   POCT Urinalysis(Instrument)    Collection Time: 03/14/25 11:16 AM   Result Value Ref Range    Color, POC UA Yellow Yellow, Straw, Colorless    Clarity, POC UA Cloudy (A) Clear    Glucose, POC UA Negative Negative    Bilirubin, POC UA Negative Negative    Ketones, POC UA Negative Negative    Spec Grav POC UA 1.015 1.005 - 1.030    Blood, POC UA Trace-intact (A) Negative    pH, POC UA 6.0 5.0 - 8.0    Protein, POC UA Negative Negative    Urobilinogen, POC UA 0.2 <=1.0    Nitrite, POC UA Negative Negative    WBC, POC UA Large (A) Negative   POCT urine pregnancy    Collection Time: 03/14/25 11:17 AM   Result Value Ref Range    POC Preg Test, Ur Negative Negative     Acceptable Yes          Assessment:     1. Urinary tract infection with hematuria, site unspecified    2. Vaginal itching    3. Screening examination for STD (sexually transmitted disease)    4. Urinary frequency        Plan:   Chart reviewed.  Patient with recent visit to her OBGYN on 2/3/25 and then ER on 2/5/25.  She was treated  for a UTI with Cephalexin and then also treated with Flagyl 2000mg for Trichomoniasis.  Denies any further STD testing.  No new sexual partners.  States she has not had intercourse since December of 2024.  Her symptoms started 3 weeks post intercourse.  She says for the last 2 months she has had urinary frequency with burning and itching and a cloudy discharge.  Odor to urine but no vaginal odor.  Denies a fish odor.  The burning and itching are occasional.      Urinary tract infection with hematuria, site unspecified  -     Urine Culture High Risk  -     nitrofurantoin, macrocrystal-monohydrate, (MACROBID) 100 MG capsule; Take 1 capsule (100 mg total) by mouth 2 (two) times daily. for 5 days  Dispense: 10 capsule; Refill: 0    Vaginal itching  -     POCT Urinalysis(Instrument)  -     POCT urine pregnancy    Screening examination for STD (sexually transmitted disease)  -     HIV 1/2 Ag/Ab (4th Gen)  -     Treponema Pallidium Antibodies IgG, IgM  -     HEPATITIS B SURFACE ANTIGEN  -     Hepatitis C Antibody  -     Vaginosis Screen by DNA Probe  -     C. trachomatis/N. gonorrhoeae by AMP DNA    Urinary frequency  -     POCT Urinalysis(Instrument)  -     POCT urine pregnancy

## 2025-03-14 NOTE — PATIENT INSTRUCTIONS
UTI:   If your condition worsens or fails to improve we recommend that you receive another evaluation at the ER immediately or contact your PCP to discuss your concerns or return here. You must understand that you've received an urgent care treatment only and that you may be released before all your medical problems are known or treated. You the patient will arrange for followup care as instructed.   If you had cultures done it will take 3-5 days to result. We will call you with the result.   If you are are female and on BCP use additional methods to prevent pregnancy while on the antibiotics and for one cycle after.   Cranberry juice may help. Get the 100% cranberry juice and mix 4 oz of juice with 4 oz of water and drink this 8 oz glass of liquid once a day.   Increase water intake to at least 8-10 glasses/day.    Avoid caffeine, alcohol, or spicy foods as they irritate the bladder.      STD:  Preventive Measures:  Increase condom use to prevent the occurrence of sexually transmitted infections (STIs).  Notify sexual partners of the need for testing to prevent the spread of STIs.  Medication Compliance:  Complete all prescribed medication courses to ensure effective treatment of infections.  Abstain from Sexual [place] After Treatment:  Avoid sexual intercourse for 7 days after treatment to allow for effective treatment and recovery.  Retesting:  Retest to ensure infection clearance, as some infections may require more aggressive treatment.  Retest for all infections in 6 weeks and again in 6 months to ensure true negative results.  Importance of Testing:  Regular testing is crucial, especially for those with high-risk sexual behaviors.  Syphilis cases are rising, and gonorrhea has resistant strains, highlighting the importance of repeat testing and preventive measures.  Truvada for PrEP Users:  Individuals on Truvada for pre-exposure prophylaxis (PrEP) have additional protection against HIV only. Condoms  should still be used for STI prevention.  Reliable Test Result Timelines:  Testing timelines for various infections are provided, ranging from 2 weeks for trichomonas, gonorrhea, and chlamydia to 6 weeks to 3 months for HIV, herpes, hepatitis C and B.  Repeat Testing:  If initial tests come back negative, it's important to repeat testing in 3-6 months to ensure ongoing negative status.  Regular testing, adherence to preventive measures, and open communication with sexual partners are essential for maintaining sexual health and preventing the spread of STIs. If you have any further questions or concerns, it's recommended to consult with a healthcare provider or sexual health professional.

## 2025-03-15 ENCOUNTER — RESULTS FOLLOW-UP (OUTPATIENT)
Dept: URGENT CARE | Facility: CLINIC | Age: 25
End: 2025-03-15

## 2025-03-15 LAB — BACTERIA UR CULT: ABNORMAL

## 2025-08-21 ENCOUNTER — OFFICE VISIT (OUTPATIENT)
Dept: OBSTETRICS AND GYNECOLOGY | Facility: CLINIC | Age: 25
End: 2025-08-21

## 2025-08-21 VITALS
SYSTOLIC BLOOD PRESSURE: 120 MMHG | BODY MASS INDEX: 26.44 KG/M2 | DIASTOLIC BLOOD PRESSURE: 80 MMHG | WEIGHT: 149.25 LBS

## 2025-08-21 DIAGNOSIS — Z20.2 TRICHOMONAS EXPOSURE: Primary | ICD-10-CM

## 2025-08-21 PROCEDURE — 99213 OFFICE O/P EST LOW 20 MIN: CPT | Mod: PBBFAC,PO | Performed by: OBSTETRICS & GYNECOLOGY

## 2025-08-21 PROCEDURE — 99999 PR PBB SHADOW E&M-EST. PATIENT-LVL III: CPT | Mod: PBBFAC,,, | Performed by: OBSTETRICS & GYNECOLOGY
